# Patient Record
Sex: FEMALE | Race: WHITE | NOT HISPANIC OR LATINO | Employment: FULL TIME | URBAN - METROPOLITAN AREA
[De-identification: names, ages, dates, MRNs, and addresses within clinical notes are randomized per-mention and may not be internally consistent; named-entity substitution may affect disease eponyms.]

---

## 2023-11-15 LAB
ABO AND RH: NORMAL
C TRACH DNA SPEC QL NAA+PROBE: NEGATIVE
HBV SURFACE AG SERPL QL IA: NEGATIVE
HCV AB SERPL QL IA: NEGATIVE
HIV 1+2 AB+HIV1 P24 AG SERPL QL IA: NEGATIVE
N GONORRHOEA DNA SPEC QL NAA+PROBE: NEGATIVE
RUBELLA IMMUNE STATUS: NORMAL
T PALLIDUM AB SER QL: NONREACTIVE

## 2024-01-16 DIAGNOSIS — O30.032 MONOCHORIONIC DIAMNIOTIC TWIN GESTATION IN SECOND TRIMESTER: Primary | ICD-10-CM

## 2024-01-16 DIAGNOSIS — D25.1 INTRAMURAL UTERINE FIBROID: ICD-10-CM

## 2024-01-18 ENCOUNTER — PATIENT MESSAGE (OUTPATIENT)
Dept: PEDIATRIC CARDIOLOGY | Facility: CLINIC | Age: 34
End: 2024-01-18
Payer: COMMERCIAL

## 2024-01-18 ENCOUNTER — PROCEDURE VISIT (OUTPATIENT)
Dept: MATERNAL FETAL MEDICINE | Facility: CLINIC | Age: 34
End: 2024-01-18
Payer: COMMERCIAL

## 2024-01-18 ENCOUNTER — TELEPHONE (OUTPATIENT)
Dept: PEDIATRIC CARDIOLOGY | Facility: CLINIC | Age: 34
End: 2024-01-18
Payer: COMMERCIAL

## 2024-01-18 ENCOUNTER — OFFICE VISIT (OUTPATIENT)
Dept: MATERNAL FETAL MEDICINE | Facility: CLINIC | Age: 34
End: 2024-01-18
Payer: COMMERCIAL

## 2024-01-18 VITALS
HEIGHT: 65 IN | HEART RATE: 98 BPM | SYSTOLIC BLOOD PRESSURE: 121 MMHG | BODY MASS INDEX: 22.51 KG/M2 | DIASTOLIC BLOOD PRESSURE: 78 MMHG | WEIGHT: 135.13 LBS

## 2024-01-18 DIAGNOSIS — D25.1 INTRAMURAL UTERINE FIBROID: ICD-10-CM

## 2024-01-18 DIAGNOSIS — O30.032 MONOCHORIONIC DIAMNIOTIC TWIN GESTATION IN SECOND TRIMESTER: Primary | ICD-10-CM

## 2024-01-18 DIAGNOSIS — O30.032 MONOCHORIONIC DIAMNIOTIC TWIN GESTATION IN SECOND TRIMESTER: ICD-10-CM

## 2024-01-18 PROCEDURE — 3008F BODY MASS INDEX DOCD: CPT | Mod: CPTII,S$GLB,, | Performed by: OBSTETRICS & GYNECOLOGY

## 2024-01-18 PROCEDURE — 76810 OB US >/= 14 WKS ADDL FETUS: CPT | Mod: S$GLB,,, | Performed by: OBSTETRICS & GYNECOLOGY

## 2024-01-18 PROCEDURE — 99204 OFFICE O/P NEW MOD 45 MIN: CPT | Mod: S$GLB,,, | Performed by: OBSTETRICS & GYNECOLOGY

## 2024-01-18 PROCEDURE — 1160F RVW MEDS BY RX/DR IN RCRD: CPT | Mod: CPTII,S$GLB,, | Performed by: OBSTETRICS & GYNECOLOGY

## 2024-01-18 PROCEDURE — 1159F MED LIST DOCD IN RCRD: CPT | Mod: CPTII,S$GLB,, | Performed by: OBSTETRICS & GYNECOLOGY

## 2024-01-18 PROCEDURE — 3074F SYST BP LT 130 MM HG: CPT | Mod: CPTII,S$GLB,, | Performed by: OBSTETRICS & GYNECOLOGY

## 2024-01-18 PROCEDURE — 3078F DIAST BP <80 MM HG: CPT | Mod: CPTII,S$GLB,, | Performed by: OBSTETRICS & GYNECOLOGY

## 2024-01-18 PROCEDURE — 76805 OB US >/= 14 WKS SNGL FETUS: CPT | Mod: S$GLB,,, | Performed by: OBSTETRICS & GYNECOLOGY

## 2024-01-18 RX ORDER — ASPIRIN 81 MG/1
81 TABLET ORAL DAILY
Qty: 30 TABLET | Refills: 6 | Status: CANCELLED | COMMUNITY
Start: 2024-01-18

## 2024-01-18 RX ORDER — FOLIC ACID 1 MG/1
1 TABLET ORAL DAILY
COMMUNITY

## 2024-01-18 NOTE — PROGRESS NOTES
"MATERNAL-FETAL MEDICINE   CONSULT NOTE    Provider requesting consultation: Dr Barber    SUBJECTIVE:     Ms. Nat Tong is a 33 y.o.  female with IUP at 17w4d who is seen in consultation by MFM for evaluation and management of:  Problem   1. Monochorionic diamniotic twin gestation in second trimester     Nat presents for consultation due to monochorionic (mono/Di) twins. She is not taking a low dose aspirin daily. She politely declined biochemical screening but states she would like want testing if there was a noted issue later in gestation.She is an RN at [employer].       Medication List with Changes/Refills   Current Medications    FOLIC ACID (FOLVITE) 1 MG TABLET    Take 1 mg by mouth once daily.    PRENATAL VIT NO.124/IRON/FOLIC (PRENATAL VITAMIN ORAL)    Take by mouth.       Review of patient's allergies indicates:  No Known Allergies    PMH:  Past Medical History:   Diagnosis Date    Gastritis        PObHx:  OB History    Para Term  AB Living   1             SAB IAB Ectopic Multiple Live Births                  # Outcome Date GA Lbr Jesus Alberto/2nd Weight Sex Delivery Anes PTL Lv   1 Current                PSH:  Past Surgical History:   Procedure Laterality Date    WISDOM TOOTH EXTRACTION         Family history:family history includes Hyperlipidemia in her mother; Hypertension in her father.    Social history: reports that she has never smoked. She has never used smokeless tobacco. She reports that she does not currently use alcohol. She reports that she does not use drugs.    Genetic history: The patient denies any inherited genetic diseases or birth defects in herself or her partner's personal history or family.    Objective:   /78   Pulse 98   Ht 5' 5" (1.651 m)   Wt 61.3 kg (135 lb 2.3 oz)   BMI 22.49 kg/m²     Ultrasound performed. See viewpoint for full ultrasound report.    Monochorionic diamniotic twin intrauterine pregnancy is identified with two living fetuses " (males).   Baby A's cord inserts marginally near fundus and the estimated fetal weight is 242g. Early anatomic survey is unremarkable. MVP is 4cm.   Baby B's cord inserts more centrally and estimated fetal weight is 207g. Early anatomic survey is unremarkable. MVP is 3.1cm.   The fetal sex is congruent and the dividing membrane is thin, in agreement with stated monochorionic diamniotic twins.   Both fetal bladders are appropriately visible.  Fetal growth is concordant with 14% difference.   The placenta is anterior.    ASSESSMENT/PLAN:     33 y.o.  female with IUP at 17w4d     1. Monochorionic diamniotic twin gestation in second trimester  We discussed monochorionic twinning and reviewed the types of placentation seen and their frequency. I counseled her on the increased incidence of preeclampsia/gestational hypertension, gestational diabetes, fetal growth restriction, anemia, congenital anomalies, need for antepartum hospitalization,  labor/PPROM, stillbirth, and risk of postpartum hemorrhage that can occur in twin pregnancies. It was explained that all monochorionic twins, have a single placenta and that this puts them at risk for twin transfusion syndrome (TTTS). We discussed twin-transfusion syndrome which is seen in ~ 10-15% of monochorionic twins due to unbalanced vascular communications within the placenta and can result in discordant fetal growth and fluid volume. We discussed that in its severe form, it can potentially result in  delivery or fetal morbidity or mortality due to poor placental function of the smaller twin and volume overload and cardiomyopathy of the larger twin. Treatment may consist of observation or laser photocoagulation of communicating vessels depending upon the circumstances and timing of the presentation. I reviewed with the patient the need for fetal ultrasound assessment for TTTS surveillance every two weeks starting at 16 weeks.     Recommendations:   Fetal  ultrasound assessment every two weeks, starting at 16 weeks until delivery for TTTS surveillance (scheduled with [facility])  If high suspicion for evolving TTTS, increase ultrasound surveillance to once or twice weekly  Transvaginal cervical length screening at time of targeted anatomy ultrasound w/ MFM  Detailed anatomy surveys at 18-20 weeks (scheduled with MFM)  Fetal growth ultrasounds every 3-4 weeks starting at 23-24 weeks (scheduled with MFM)  Fetal echocardiogram at 22-24 weeks (MFM will arrange)  Low-dose aspirin daily (81 mg) beginning at 12-13 weeks to reduce the risk of preeclampsia - will  OTC and start this week  Folic acid 1 mg daily and ferrous sulfate 325 mg daily  Increase daily dietary intake by approximately 300 kcal above that for a mares pregnancy, or 600 kcal over that of a nonpregnant woman and monitor weight gain   testing with weekly NST and AFV assessment beginning at 32 weeks (can alternate with MFM every other week)  Given the increased risks of a twin pregnancy, prenatal visits every 2-3 weeks from 24 - 32/34 weeks and weekly prenatal visits thereafter    Delivery timing:   Normal growth, no complications: 37 0/7 - 37 6/7 weeks gestation   Normal growth, maternal comorbidities: 36 0/7 - 36 6/7 weeks gestation   FGR of one or both: 34 0/7 - 35 6/7 weeks gestation  History of TTTS, FGR with abnormal UA Doppler, oligohydramnios, or comorbid conditions: 32 0/7 - 34 6/7 weeks gestation    Comorbid conditions include: BMI >= 40, diabetes, hypertension, and complex maternal medical conditions associated with placental dysfunction (Lupus, renal disease, or other vascular disease).        FOLLOW UP:   F/u Q2w for TTTS surveillance  Will perform targeted ultrasound at next visit    This consultation was completed with the assistance of Yolanda Shay NP.        Christianne Saleem MD  Maternal Fetal Medicine

## 2024-01-18 NOTE — ASSESSMENT & PLAN NOTE
We discussed monochorionic twinning and reviewed the types of placentation seen and their frequency. I counseled her on the increased incidence of preeclampsia/gestational hypertension, gestational diabetes, fetal growth restriction, anemia, congenital anomalies, need for antepartum hospitalization,  labor/PPROM, stillbirth, and risk of postpartum hemorrhage that can occur in twin pregnancies. It was explained that all monochorionic twins, have a single placenta and that this puts them at risk for twin transfusion syndrome (TTTS). We discussed twin-transfusion syndrome which is seen in ~ 10-15% of monochorionic twins due to unbalanced vascular communications within the placenta and can result in discordant fetal growth and fluid volume. We discussed that in its severe form, it can potentially result in  delivery or fetal morbidity or mortality due to poor placental function of the smaller twin and volume overload and cardiomyopathy of the larger twin. Treatment may consist of observation or laser photocoagulation of communicating vessels depending upon the circumstances and timing of the presentation. I reviewed with the patient the need for fetal ultrasound assessment for TTTS surveillance every two weeks starting at 16 weeks.     Recommendations:   Fetal ultrasound assessment every two weeks, starting at 16 weeks until delivery for TTTS surveillance (scheduled with MFM)  If high suspicion for evolving TTTS, increase ultrasound surveillance to once or twice weekly  Transvaginal cervical length screening at time of targeted anatomy ultrasound w/ MFM  Detailed anatomy surveys at 18-20 weeks (scheduled with MFM)  Fetal growth ultrasounds every 3-4 weeks starting at 23-24 weeks (scheduled with MFM)  Fetal echocardiogram at 22-24 weeks (MFM will arrange)  Low-dose aspirin daily (81 mg) beginning at 12-13 weeks to reduce the risk of preeclampsia - will  OTC and start this week  Folic acid 1 mg daily  and ferrous sulfate 325 mg daily  Increase daily dietary intake by approximately 300 kcal above that for a mares pregnancy, or 600 kcal over that of a nonpregnant woman and monitor weight gain   testing with weekly NST and AFV assessment beginning at 32 weeks (can alternate with MFM every other week)  Given the increased risks of a twin pregnancy, prenatal visits every 2-3 weeks from 24 - 32/34 weeks and weekly prenatal visits thereafter    Delivery timing:   Normal growth, no complications: 37 0/7 - 37 6/7 weeks gestation   Normal growth, maternal comorbidities: 36 0/7 - 36 6/7 weeks gestation   FGR of one or both: 34 0/7 - 35 6/7 weeks gestation  History of TTTS, FGR with abnormal UA Doppler, oligohydramnios, or comorbid conditions: 32 0/7 - 34 6/7 weeks gestation    Comorbid conditions include: BMI >= 40, diabetes, hypertension, and complex maternal medical conditions associated with placental dysfunction (Lupus, renal disease, or other vascular disease).

## 2024-01-18 NOTE — TELEPHONE ENCOUNTER
Call placed to patient in an attempt to schedule her fetal echos. Call answered by voicemail recording. VM left requesting a return call to [phone] to schedule fetal echos.

## 2024-01-29 DIAGNOSIS — D25.1 INTRAMURAL UTERINE FIBROID: ICD-10-CM

## 2024-01-29 DIAGNOSIS — O30.032 MONOCHORIONIC DIAMNIOTIC TWIN GESTATION IN SECOND TRIMESTER: Primary | ICD-10-CM

## 2024-01-31 ENCOUNTER — OFFICE VISIT (OUTPATIENT)
Dept: MATERNAL FETAL MEDICINE | Facility: CLINIC | Age: 34
End: 2024-01-31
Payer: COMMERCIAL

## 2024-01-31 ENCOUNTER — PROCEDURE VISIT (OUTPATIENT)
Dept: MATERNAL FETAL MEDICINE | Facility: CLINIC | Age: 34
End: 2024-01-31
Payer: COMMERCIAL

## 2024-01-31 VITALS
HEIGHT: 65 IN | WEIGHT: 140 LBS | HEART RATE: 113 BPM | DIASTOLIC BLOOD PRESSURE: 69 MMHG | BODY MASS INDEX: 23.32 KG/M2 | SYSTOLIC BLOOD PRESSURE: 127 MMHG

## 2024-01-31 DIAGNOSIS — O30.032 MONOCHORIONIC DIAMNIOTIC TWIN GESTATION IN SECOND TRIMESTER: ICD-10-CM

## 2024-01-31 DIAGNOSIS — O30.032 MONOCHORIONIC DIAMNIOTIC TWIN GESTATION IN SECOND TRIMESTER: Primary | ICD-10-CM

## 2024-01-31 DIAGNOSIS — D25.1 INTRAMURAL UTERINE FIBROID: ICD-10-CM

## 2024-01-31 PROCEDURE — 3008F BODY MASS INDEX DOCD: CPT | Mod: CPTII,S$GLB,, | Performed by: OBSTETRICS & GYNECOLOGY

## 2024-01-31 PROCEDURE — 76811 OB US DETAILED SNGL FETUS: CPT | Mod: S$GLB,,, | Performed by: OBSTETRICS & GYNECOLOGY

## 2024-01-31 PROCEDURE — 3078F DIAST BP <80 MM HG: CPT | Mod: CPTII,S$GLB,, | Performed by: OBSTETRICS & GYNECOLOGY

## 2024-01-31 PROCEDURE — 1160F RVW MEDS BY RX/DR IN RCRD: CPT | Mod: CPTII,S$GLB,, | Performed by: OBSTETRICS & GYNECOLOGY

## 2024-01-31 PROCEDURE — 76817 TRANSVAGINAL US OBSTETRIC: CPT | Mod: S$GLB,,, | Performed by: OBSTETRICS & GYNECOLOGY

## 2024-01-31 PROCEDURE — 3074F SYST BP LT 130 MM HG: CPT | Mod: CPTII,S$GLB,, | Performed by: OBSTETRICS & GYNECOLOGY

## 2024-01-31 PROCEDURE — 1159F MED LIST DOCD IN RCRD: CPT | Mod: CPTII,S$GLB,, | Performed by: OBSTETRICS & GYNECOLOGY

## 2024-01-31 PROCEDURE — 99213 OFFICE O/P EST LOW 20 MIN: CPT | Mod: S$GLB,,, | Performed by: OBSTETRICS & GYNECOLOGY

## 2024-01-31 PROCEDURE — 76812 OB US DETAILED ADDL FETUS: CPT | Mod: S$GLB,,, | Performed by: OBSTETRICS & GYNECOLOGY

## 2024-01-31 RX ORDER — ASPIRIN 81 MG/1
81 TABLET ORAL DAILY
COMMUNITY

## 2024-01-31 NOTE — PROGRESS NOTES
"Maternal Fetal Medicine follow up consult    SUBJECTIVE:     Nat Tong is a 33 y.o.  female with IUP at 19w3d who is seen in follow up consultation by MFM.  Pregnancy complications include:   Problem   1. Monochorionic diamniotic twin gestation in second trimester     Nat presents for routine follow up appointment.  Denies LOF, VB, contractions. Positive fetal movement  She is taking baby aspirin.    Previous notes reviewed.   No changes to medical, surgical, family, social, or obstetric history.    Interval history since last MFM visit: see above    Medications:  Current Outpatient Medications   Medication Instructions    aspirin (ECOTRIN) 81 mg, Oral, Daily    folic acid (FOLVITE) 1 mg, Oral, Daily    prenatal vit no.124/iron/folic (PRENATAL VITAMIN ORAL) Oral       Care team members:  Dr Barber - Primary OB     OBJECTIVE:   /69   Pulse (!) 113   Ht 5' 5" (1.651 m)   Wt 63.5 kg (139 lb 15.9 oz)   BMI 23.30 kg/m²     Ultrasound performed. See viewpoint for full ultrasound report.    Monochorionic diamniotic twin intrauterine pregnancy is identified with two living fetuses (breech-breech).   Baby A measures 270g and has unremarkable fetal anatomy. Suboptimal views remain for LVOT, AA, DA, 3VC, and feet position. MVP is 3.1cm.  Baby B measures 306g and has unremarkable fetal anatomy. Suboptimal views remain for RVOT, septum and feet position. MVP is 3.6cm.   The fetal sex is congruent and the dividing membrane is thin, in agreement with stated monochorionic diamniotic twins.  Both fetal bladders and stomachs are visible. Amniotic fluid volume is normal for both.  Fetal growth is concordant with 12% difference.   The placenta is anterior with a placental sinus noted near the lower uterine segment. The cord insertion for baby A is marginal near the fundus. Baby B's cord insertion is central.  A small fibroid is present anteriorly measuring 2.7 x 3.6 x 3.8cm.   Transvaginal cervical length " measures 3.1cm.    ASSESSMENT/PLAN:     33 y.o.  female with IUP at 19w3d    1. Monochorionic diamniotic twin gestation in second trimester  Previously counseled on monochorionic twinning and reviewed the types of placentation seen and their frequency. I counseled her on the increased incidence of preeclampsia/gestational hypertension, gestational diabetes, fetal growth restriction, anemia, congenital anomalies, need for antepartum hospitalization,  labor/PPROM, stillbirth, and risk of postpartum hemorrhage that can occur in twin pregnancies. It was explained that all monochorionic twins, have a single placenta and that this puts them at risk for twin transfusion syndrome (TTTS).     24- Concordant growth with subjectively normal AFV. Stomach and bladder visualized for each twin. No evidence of TTTS    Recommendations:   Fetal ultrasound assessment every two weeks, starting at 16 weeks until delivery for TTTS surveillance (scheduled with MFM)  If high suspicion for evolving TTTS, increase ultrasound surveillance to once or twice weekly  Transvaginal cervical length screening at time of targeted anatomy ultrasound w/ MFM (completed & normal)  Detailed anatomy surveys at 18-20 weeks (started, some views suboptimal)  Fetal growth ultrasounds every 3-4 weeks starting at 23-24 weeks (scheduled with MFM)  Fetal echocardiogram at 22-24 weeks (scheduled 3/12/24)  Low-dose aspirin daily (81 mg) beginning at 12-13 weeks to reduce the risk of preeclampsia - she is taking  Folic acid 1 mg daily and ferrous sulfate 325 mg daily  Increase daily dietary intake by approximately 300 kcal above that for a mares pregnancy, or 600 kcal over that of a nonpregnant woman and monitor weight gain   testing with weekly NST and AFV assessment beginning at 32 weeks (can alternate with MFM every other week)  Given the increased risks of a twin pregnancy, prenatal visits every 2-3 weeks from 24 - 32/34 weeks and  weekly prenatal visits thereafter    Delivery timing:   Normal growth, no complications: 37 0/7 - 37 6/7 weeks gestation   Normal growth, maternal comorbidities: 36 0/7 - 36 6/7 weeks gestation   FGR of one or both: 34 0/7 - 35 6/7 weeks gestation  History of TTTS, FGR with abnormal UA Doppler, oligohydramnios, or comorbid conditions: 32 0/7 - 34 6/7 weeks gestation    Comorbid conditions include: BMI >= 40, diabetes, hypertension, and complex maternal medical conditions associated with placental dysfunction (Lupus, renal disease, or other vascular disease).    F/u q2w for TTTS surveillance  F/u in 4 weeks for MFM visit/growth ultrasound    Christianne Saleem MD  Maternal Fetal Medicine

## 2024-01-31 NOTE — ASSESSMENT & PLAN NOTE
Previously counseled on monochorionic twinning and reviewed the types of placentation seen and their frequency. I counseled her on the increased incidence of preeclampsia/gestational hypertension, gestational diabetes, fetal growth restriction, anemia, congenital anomalies, need for antepartum hospitalization,  labor/PPROM, stillbirth, and risk of postpartum hemorrhage that can occur in twin pregnancies. It was explained that all monochorionic twins, have a single placenta and that this puts them at risk for twin transfusion syndrome (TTTS).     24- Concordant growth with subjectively normal AFV. Stomach and bladder visualized for each twin. No evidence of TTTS    Recommendations:   Fetal ultrasound assessment every two weeks, starting at 16 weeks until delivery for TTTS surveillance (scheduled with MFM)  If high suspicion for evolving TTTS, increase ultrasound surveillance to once or twice weekly  Transvaginal cervical length screening at time of targeted anatomy ultrasound w/ MFM (completed & normal)  Detailed anatomy surveys at 18-20 weeks (started, some views suboptimal)  Fetal growth ultrasounds every 3-4 weeks starting at 23-24 weeks (scheduled with MFM)  Fetal echocardiogram at 22-24 weeks (scheduled 3/12/24)  Low-dose aspirin daily (81 mg) beginning at 12-13 weeks to reduce the risk of preeclampsia - she is taking  Folic acid 1 mg daily and ferrous sulfate 325 mg daily  Increase daily dietary intake by approximately 300 kcal above that for a mares pregnancy, or 600 kcal over that of a nonpregnant woman and monitor weight gain   testing with weekly NST and AFV assessment beginning at 32 weeks (can alternate with MFM every other week)  Given the increased risks of a twin pregnancy, prenatal visits every 2-3 weeks from 24 - 32/34 weeks and weekly prenatal visits thereafter    Delivery timing:   Normal growth, no complications: 37 0/7 - 37 6/7 weeks gestation   Normal growth, maternal  comorbidities: 36 0/7 - 36 6/7 weeks gestation   FGR of one or both: 34 0/7 - 35 6/7 weeks gestation  History of TTTS, FGR with abnormal UA Doppler, oligohydramnios, or comorbid conditions: 32 0/7 - 34 6/7 weeks gestation    Comorbid conditions include: BMI >= 40, diabetes, hypertension, and complex maternal medical conditions associated with placental dysfunction (Lupus, renal disease, or other vascular disease).

## 2024-02-01 ENCOUNTER — PATIENT MESSAGE (OUTPATIENT)
Dept: MATERNAL FETAL MEDICINE | Facility: CLINIC | Age: 34
End: 2024-02-01
Payer: COMMERCIAL

## 2024-02-07 DIAGNOSIS — O30.032 MONOCHORIONIC DIAMNIOTIC TWIN GESTATION IN SECOND TRIMESTER: Primary | ICD-10-CM

## 2024-02-15 ENCOUNTER — PROCEDURE VISIT (OUTPATIENT)
Dept: MATERNAL FETAL MEDICINE | Facility: CLINIC | Age: 34
End: 2024-02-15
Payer: COMMERCIAL

## 2024-02-15 VITALS — SYSTOLIC BLOOD PRESSURE: 112 MMHG | HEART RATE: 139 BPM | DIASTOLIC BLOOD PRESSURE: 67 MMHG

## 2024-02-15 DIAGNOSIS — O30.032 MONOCHORIONIC DIAMNIOTIC TWIN GESTATION IN SECOND TRIMESTER: ICD-10-CM

## 2024-02-15 DIAGNOSIS — F41.9 ANXIETY DURING PREGNANCY: Primary | ICD-10-CM

## 2024-02-15 DIAGNOSIS — O99.340 ANXIETY DURING PREGNANCY: Primary | ICD-10-CM

## 2024-02-15 PROCEDURE — 76815 OB US LIMITED FETUS(S): CPT | Mod: S$GLB,,, | Performed by: OBSTETRICS & GYNECOLOGY

## 2024-02-15 RX ORDER — BUSPIRONE HYDROCHLORIDE 5 MG/1
5 TABLET ORAL 3 TIMES DAILY PRN
Qty: 60 TABLET | Refills: 3 | Status: SHIPPED | OUTPATIENT
Start: 2024-02-15

## 2024-02-29 ENCOUNTER — PROCEDURE VISIT (OUTPATIENT)
Dept: MATERNAL FETAL MEDICINE | Facility: CLINIC | Age: 34
End: 2024-02-29
Payer: COMMERCIAL

## 2024-02-29 ENCOUNTER — OFFICE VISIT (OUTPATIENT)
Dept: MATERNAL FETAL MEDICINE | Facility: CLINIC | Age: 34
End: 2024-02-29
Payer: COMMERCIAL

## 2024-02-29 VITALS
HEART RATE: 121 BPM | HEIGHT: 65 IN | DIASTOLIC BLOOD PRESSURE: 77 MMHG | WEIGHT: 145.94 LBS | BODY MASS INDEX: 24.32 KG/M2 | SYSTOLIC BLOOD PRESSURE: 123 MMHG

## 2024-02-29 DIAGNOSIS — O30.032 MONOCHORIONIC DIAMNIOTIC TWIN GESTATION IN SECOND TRIMESTER: Primary | ICD-10-CM

## 2024-02-29 DIAGNOSIS — O30.032 MONOCHORIONIC DIAMNIOTIC TWIN PREGNANCY IN SECOND TRIMESTER: ICD-10-CM

## 2024-02-29 DIAGNOSIS — O30.032 MONOCHORIONIC DIAMNIOTIC TWIN GESTATION IN SECOND TRIMESTER: ICD-10-CM

## 2024-02-29 DIAGNOSIS — O30.032 MONOCHORIONIC DIAMNIOTIC TWIN PREGNANCY IN SECOND TRIMESTER: Primary | ICD-10-CM

## 2024-02-29 PROCEDURE — 3074F SYST BP LT 130 MM HG: CPT | Mod: CPTII,S$GLB,, | Performed by: OBSTETRICS & GYNECOLOGY

## 2024-02-29 PROCEDURE — 76812 OB US DETAILED ADDL FETUS: CPT | Mod: S$GLB,,, | Performed by: OBSTETRICS & GYNECOLOGY

## 2024-02-29 PROCEDURE — 3008F BODY MASS INDEX DOCD: CPT | Mod: CPTII,S$GLB,, | Performed by: OBSTETRICS & GYNECOLOGY

## 2024-02-29 PROCEDURE — 1159F MED LIST DOCD IN RCRD: CPT | Mod: CPTII,S$GLB,, | Performed by: OBSTETRICS & GYNECOLOGY

## 2024-02-29 PROCEDURE — 76811 OB US DETAILED SNGL FETUS: CPT | Mod: S$GLB,,, | Performed by: OBSTETRICS & GYNECOLOGY

## 2024-02-29 PROCEDURE — 3078F DIAST BP <80 MM HG: CPT | Mod: CPTII,S$GLB,, | Performed by: OBSTETRICS & GYNECOLOGY

## 2024-02-29 PROCEDURE — 99213 OFFICE O/P EST LOW 20 MIN: CPT | Mod: S$GLB,,, | Performed by: OBSTETRICS & GYNECOLOGY

## 2024-02-29 PROCEDURE — 1160F RVW MEDS BY RX/DR IN RCRD: CPT | Mod: CPTII,S$GLB,, | Performed by: OBSTETRICS & GYNECOLOGY

## 2024-02-29 NOTE — ASSESSMENT & PLAN NOTE
Previously counseled on monochorionic twinning and reviewed the types of placentation seen and their frequency. I counseled her on the increased incidence of preeclampsia/gestational hypertension, gestational diabetes, fetal growth restriction, anemia, congenital anomalies, need for antepartum hospitalization,  labor/PPROM, stillbirth, and risk of postpartum hemorrhage that can occur in twin pregnancies. It was explained that all monochorionic twins, have a single placenta and that this puts them at risk for twin transfusion syndrome (TTTS).     24- Concordant growth with subjectively normal AFV. Stomach and bladder visualized for each twin. No evidence of TTTS  24- Baby A EFW 653g, Baby B EFW 600g; Concordant growth with subjectively normal AFV. Stomach and bladder visualized for each twin. No evidence of TTTS    Recommendations:   Fetal ultrasound assessment every two weeks, starting at 16 weeks until delivery for TTTS surveillance (scheduled with MFM)  If high suspicion for evolving TTTS, increase ultrasound surveillance to once or twice weekly  Transvaginal cervical length screening at time of targeted anatomy ultrasound w/ MFM (completed & normal)  Detailed anatomy surveys at 18-20 weeks (started, some views suboptimal)  Fetal growth ultrasounds every 3-4 weeks starting at 23-24 weeks (scheduled with MFM)  Fetal echocardiogram at 22-24 weeks (scheduled 3/12/24)  Low-dose aspirin daily (81 mg) beginning at 12-13 weeks to reduce the risk of preeclampsia - she is taking  Folic acid 1 mg daily and ferrous sulfate 325 mg daily  Increase daily dietary intake by approximately 300 kcal above that for a mares pregnancy, or 600 kcal over that of a nonpregnant woman and monitor weight gain   testing with weekly NST and AFV assessment beginning at 32 weeks (can alternate with MFM every other week)  Given the increased risks of a twin pregnancy, prenatal visits every 2-3 weeks from 24 -   weeks and weekly prenatal visits thereafter    Delivery timing:   Normal growth, no complications: 37 0/7 - 37 6/7 weeks gestation   Normal growth, maternal comorbidities: 36 0/7 - 36 6/7 weeks gestation   FGR of one or both: 34 0/7 - 35 6/7 weeks gestation  History of TTTS, FGR with abnormal UA Doppler, oligohydramnios, or comorbid conditions: 32 0/7 - 34 6/7 weeks gestation    Comorbid conditions include: BMI >= 40, diabetes, hypertension, and complex maternal medical conditions associated with placental dysfunction (Lupus, renal disease, or other vascular disease).

## 2024-03-01 NOTE — PROGRESS NOTES
"Maternal Fetal Medicine follow up consult    SUBJECTIVE:     Nat Tong is a 33 y.o.  female with IUP at 23w4d who is seen in follow up consultation by MFM.  Pregnancy complications include:   Problem   1. Monochorionic diamniotic twin gestation in second trimester       Nat presents for routine follow up appointment.  Denies LOF, VB, contractions. Positive fetal movement  She is taking baby aspirin.  She has not started the buspar rx given. 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    Previous notes reviewed.   No changes to medical, surgical, family, social, or obstetric history.    Interval history since last MFM visit: see above    Medications:  Current Outpatient Medications   Medication Instructions    aspirin (ECOTRIN) 81 mg, Oral, Daily    busPIRone (BUSPAR) 5 mg, Oral, 3 times daily PRN    folic acid (FOLVITE) 1 mg, Oral, Daily    prenatal vit no.124/iron/folic (PRENATAL VITAMIN ORAL) Oral       Care team members:  Dr Barber - Primary OB     OBJECTIVE:   /77   Pulse (!) 121   Ht 5' 5" (1.651 m)   Wt 66.2 kg (145 lb 15.1 oz)   BMI 24.29 kg/m²     Ultrasound performed. See viewpoint for full ultrasound report.    A viable monochorionic- diamniotic pregnancy is visualized in the vertex-breech position.   At her previous anatomy scan, fetuses were mislabeled and error has been noted. On anatomy visit, A is B and B is A.   Baby A (left, fundal/ marginal cord insertion)- No suspected abnormalities and anatomy remains suboptimal for feet only. The fetus is male.  Baby B (right, central cord insertion) has no suspected abnormalities and anatomic survey remains suboptimal for AA only. The fetus is male.   Estimated fetal weights for the fetuses are as follows: Baby A EFW 653g, Baby B EFW 600g. Both are appropriate for gestational age with 8% difference (concordant growth).   Amniotic fluid volume is normal for " both with MVPs of  4.6cm (A) and 4cm (B).  Both bladders are visible, no signs of TTTS.   Placental location is anterior without evidence of previa.     ASSESSMENT/PLAN:     33 y.o.  female with IUP at 23w4d    1. Monochorionic diamniotic twin gestation in second trimester  Previously counseled on monochorionic twinning and reviewed the types of placentation seen and their frequency. I counseled her on the increased incidence of preeclampsia/gestational hypertension, gestational diabetes, fetal growth restriction, anemia, congenital anomalies, need for antepartum hospitalization,  labor/PPROM, stillbirth, and risk of postpartum hemorrhage that can occur in twin pregnancies. It was explained that all monochorionic twins, have a single placenta and that this puts them at risk for twin transfusion syndrome (TTTS).     24- Concordant growth with subjectively normal AFV. Stomach and bladder visualized for each twin. No evidence of TTTS  24- Baby A EFW 653g, Baby B EFW 600g; Concordant growth with subjectively normal AFV. Stomach and bladder visualized for each twin. No evidence of TTTS    Recommendations:   Fetal ultrasound assessment every two weeks, starting at 16 weeks until delivery for TTTS surveillance (scheduled with MFM)  If high suspicion for evolving TTTS, increase ultrasound surveillance to once or twice weekly  Transvaginal cervical length screening at time of targeted anatomy ultrasound w/ MFM (completed & normal)  Detailed anatomy surveys at 18-20 weeks (started, some views suboptimal)  Fetal growth ultrasounds every 3-4 weeks starting at 23-24 weeks (scheduled with MFM)  Fetal echocardiogram at 22-24 weeks (scheduled 3/12/24)  Low-dose aspirin daily (81 mg) beginning at 12-13 weeks to reduce the risk of preeclampsia - she is taking  Folic acid 1 mg daily and ferrous sulfate 325 mg daily  Increase daily dietary intake by approximately 300 kcal above that for a mares pregnancy, or  600 kcal over that of a nonpregnant woman and monitor weight gain   testing with weekly NST and AFV assessment beginning at 32 weeks (can alternate with MFM every other week)  Given the increased risks of a twin pregnancy, prenatal visits every 2-3 weeks from 24 - 32/34 weeks and weekly prenatal visits thereafter    Delivery timing:   Normal growth, no complications: 37 0/7 - 37 6/7 weeks gestation   Normal growth, maternal comorbidities: 36 0/7 - 36 6/7 weeks gestation   FGR of one or both: 34 0/7 - 35 6/7 weeks gestation  History of TTTS, FGR with abnormal UA Doppler, oligohydramnios, or comorbid conditions: 32 0/7 - 34 6/7 weeks gestation    Comorbid conditions include: BMI >= 40, diabetes, hypertension, and complex maternal medical conditions associated with placental dysfunction (Lupus, renal disease, or other vascular disease).    F/u q2w for TTTS surveillance  F/u in 4 weeks for MFM visit/growth ultrasound    Christianne Saleem MD  Maternal Fetal Medicine

## 2024-03-12 ENCOUNTER — OFFICE VISIT (OUTPATIENT)
Dept: PEDIATRIC CARDIOLOGY | Facility: CLINIC | Age: 34
End: 2024-03-12
Payer: COMMERCIAL

## 2024-03-12 ENCOUNTER — PROCEDURE VISIT (OUTPATIENT)
Dept: MATERNAL FETAL MEDICINE | Facility: CLINIC | Age: 34
End: 2024-03-12
Payer: COMMERCIAL

## 2024-03-12 VITALS
HEIGHT: 65 IN | WEIGHT: 147.94 LBS | DIASTOLIC BLOOD PRESSURE: 74 MMHG | HEART RATE: 117 BPM | HEART RATE: 117 BPM | SYSTOLIC BLOOD PRESSURE: 121 MMHG | DIASTOLIC BLOOD PRESSURE: 74 MMHG | SYSTOLIC BLOOD PRESSURE: 121 MMHG | BODY MASS INDEX: 24.65 KG/M2

## 2024-03-12 DIAGNOSIS — O30.032 MONOCHORIONIC DIAMNIOTIC TWIN GESTATION IN SECOND TRIMESTER: Primary | ICD-10-CM

## 2024-03-12 DIAGNOSIS — O30.032 MONOCHORIONIC DIAMNIOTIC TWIN GESTATION IN SECOND TRIMESTER: ICD-10-CM

## 2024-03-12 PROCEDURE — 99204 OFFICE O/P NEW MOD 45 MIN: CPT | Mod: 25,S$GLB,, | Performed by: PEDIATRICS

## 2024-03-12 PROCEDURE — 76815 OB US LIMITED FETUS(S): CPT | Mod: S$GLB,,, | Performed by: OBSTETRICS & GYNECOLOGY

## 2024-03-12 PROCEDURE — 3074F SYST BP LT 130 MM HG: CPT | Mod: CPTII,S$GLB,, | Performed by: PEDIATRICS

## 2024-03-12 PROCEDURE — 3078F DIAST BP <80 MM HG: CPT | Mod: CPTII,S$GLB,, | Performed by: PEDIATRICS

## 2024-03-12 PROCEDURE — 3008F BODY MASS INDEX DOCD: CPT | Mod: CPTII,S$GLB,, | Performed by: PEDIATRICS

## 2024-03-12 PROCEDURE — 1159F MED LIST DOCD IN RCRD: CPT | Mod: CPTII,S$GLB,, | Performed by: PEDIATRICS

## 2024-03-12 NOTE — PROGRESS NOTES
[facility] - Pediatric Cardiology Fetal Cardiology Clinic    Today, I had the pleasure of evaluating Nat Tong who is now 33 y.o. and carrying her first pregnancy at 25 2/7 weeks gestation with an ESEQUIEL of 24. She was referred for evaluation of the fetal heart due to mono-di twin gestation.       She is carrying a male fetuses, to be named Zachary and Celestine.  Pregnancy thus far has been otherwise uncomplicated with no signs of twin-twin syndrome.     Obstetric History:    .  Her OB history is otherwise unremarkable.     Past Medical History:   Diagnosis Date    Gastritis          Current Outpatient Medications:     aspirin (ECOTRIN) 81 MG EC tablet, Take 81 mg by mouth once daily., Disp: , Rfl:     busPIRone (BUSPAR) 5 MG Tab, Take 1 tablet (5 mg total) by mouth 3 (three) times daily as needed (anxiety)., Disp: 60 tablet, Rfl: 3    folic acid (FOLVITE) 1 MG tablet, Take 1 mg by mouth once daily., Disp: , Rfl:     prenatal vit no.124/iron/folic (PRENATAL VITAMIN ORAL), Take by mouth., Disp: , Rfl:      Review of patient's allergies indicates:  No Known Allergies    Family History: Negative for congenital heart disease, early coronary artery disease, sudden unexplained death, connective tissues disorders, genetic syndromes, multiple miscarriages or other congenital anomalies.    Social History: Ms. Nat Tong is  to the father of the baby.  The father of the baby is involved.     FETAL ECHOCARDIOGRAM (summary    Fetus labeled A on MFM evaluation. Fetus in maternal LUQ, transverse lie.   The fetal systemic and pulmoanry veins were not optimally seen.   Otherwise normal fetal echocardiogram.    Fetus labeled B on MFM evaluation. Fetus on maternal right, breech, closest to cervix. The fetus was tachycardiac in the beginning of the study in the 170-180s. The rhythm appeared to be sinus. Slowed down to low 160s at end of study. Suboptimal views of the aortic arch with normal 3 vessel view  and no indirect evidence of arch obstruction. Otherwise normal fetal echocardiogram. Results discussed with Dr. Saleem. Patient evaluated by MFM to insure fetal heart rate improved.     (Full report in electronic medical record)    Impression:  Twin active male fetus at 25 2/7 wga. Fetus B (maternal RLQ) had tachycardiac during the echo. His heart is structurally normal. He was monitored by MFM and his heart rate returned into a normal range prior to patient leaving the clinic.     Todays fetal echocardiograms are normal, within the limitations of fetal echocardiography.  I discussed with her that fetal echocardiography is insufficiently sensitive to rule out all septal defects, anomalies of pulmonary and systemic veins, arch anomalies, and some valvar abnormalities, nor can it ensure that the ductus arteriosus and foramen ovale will spontaneously close.     Recommendations:  Location, timing, and mode of delivery will be determined by the obstetrical team.  She does not require further follow-up in the fetal echocardiography clinic, but I would be happy to see her again if additional questions or concerns arise.    Should there be any concerns about the baby's heart after birth, a post-benny echocardiogram and cardiology consultation are recommended.           Jeannette Lara MD, MSCI  Pediatric Cardiology  Pediatric Echocardiography, Fetal Echocardiography, Cardiac MRI  [facility] [address] [place]  [address]  Phone [phone], Fax [phone]        The above information was discussed in detail including the use of diagrams, with 30 minutes of total face to face time, with greater than 50% with counseling and coordination of care.  The discussion of the diagnosis and treatment options is as described above.

## 2024-03-28 ENCOUNTER — PROCEDURE VISIT (OUTPATIENT)
Dept: MATERNAL FETAL MEDICINE | Facility: CLINIC | Age: 34
End: 2024-03-28
Payer: COMMERCIAL

## 2024-03-28 ENCOUNTER — OFFICE VISIT (OUTPATIENT)
Dept: MATERNAL FETAL MEDICINE | Facility: CLINIC | Age: 34
End: 2024-03-28
Payer: COMMERCIAL

## 2024-03-28 VITALS
HEART RATE: 102 BPM | BODY MASS INDEX: 25.71 KG/M2 | SYSTOLIC BLOOD PRESSURE: 119 MMHG | WEIGHT: 154.31 LBS | DIASTOLIC BLOOD PRESSURE: 73 MMHG | HEIGHT: 65 IN

## 2024-03-28 DIAGNOSIS — O30.032 MONOCHORIONIC DIAMNIOTIC TWIN GESTATION IN SECOND TRIMESTER: Primary | ICD-10-CM

## 2024-03-28 DIAGNOSIS — O30.032 MONOCHORIONIC DIAMNIOTIC TWIN GESTATION IN SECOND TRIMESTER: ICD-10-CM

## 2024-03-28 PROCEDURE — 3008F BODY MASS INDEX DOCD: CPT | Mod: CPTII,S$GLB,, | Performed by: OBSTETRICS & GYNECOLOGY

## 2024-03-28 PROCEDURE — 1160F RVW MEDS BY RX/DR IN RCRD: CPT | Mod: CPTII,S$GLB,, | Performed by: OBSTETRICS & GYNECOLOGY

## 2024-03-28 PROCEDURE — 3078F DIAST BP <80 MM HG: CPT | Mod: CPTII,S$GLB,, | Performed by: OBSTETRICS & GYNECOLOGY

## 2024-03-28 PROCEDURE — 76816 OB US FOLLOW-UP PER FETUS: CPT | Mod: S$GLB,,, | Performed by: OBSTETRICS & GYNECOLOGY

## 2024-03-28 PROCEDURE — 99213 OFFICE O/P EST LOW 20 MIN: CPT | Mod: S$GLB,,, | Performed by: OBSTETRICS & GYNECOLOGY

## 2024-03-28 PROCEDURE — 1159F MED LIST DOCD IN RCRD: CPT | Mod: CPTII,S$GLB,, | Performed by: OBSTETRICS & GYNECOLOGY

## 2024-03-28 PROCEDURE — 3074F SYST BP LT 130 MM HG: CPT | Mod: CPTII,S$GLB,, | Performed by: OBSTETRICS & GYNECOLOGY

## 2024-03-28 RX ORDER — FERRIC MALTOL 30 MG/1
1 CAPSULE ORAL DAILY
COMMUNITY
Start: 2024-03-14

## 2024-03-28 NOTE — ASSESSMENT & PLAN NOTE
Previously counseled on monochorionic twinning and reviewed the types of placentation seen and their frequency. I counseled her on the increased incidence of preeclampsia/gestational hypertension, gestational diabetes, fetal growth restriction, anemia, congenital anomalies, need for antepartum hospitalization,  labor/PPROM, stillbirth, and risk of postpartum hemorrhage that can occur in twin pregnancies. It was explained that all monochorionic twins, have a single placenta and that this puts them at risk for twin transfusion syndrome (TTTS).     24- Concordant growth with subjectively normal AFV. Stomach and bladder visualized for each twin. No evidence of TTTS  24- Baby A EFW 653g, Baby B EFW 600g; Concordant growth with subjectively normal AFV. Stomach and bladder visualized for each twin. No evidence of TTTS  3/28/24- concordant growths (10%). Normal AFV for each twin. Stomach and bladders visualized for both.     Recommendations:   Fetal ultrasound assessment every two weeks, starting at 16 weeks until delivery for TTTS surveillance (scheduled with MFM)  If high suspicion for evolving TTTS, increase ultrasound surveillance to once or twice weekly  Transvaginal cervical length screening at time of targeted anatomy ultrasound w/ MFM (completed & normal)  Detailed anatomy surveys at 18-20 weeks (started, some views suboptimal)  Fetal growth ultrasounds every 3-4 weeks starting at 23-24 weeks (scheduled with MFM)  Fetal echocardiogram at 22-24 weeks (scheduled 3/12/24)  Low-dose aspirin daily (81 mg) beginning at 12-13 weeks to reduce the risk of preeclampsia - she is taking  Folic acid 1 mg daily and ferrous sulfate 325 mg daily  Increase daily dietary intake by approximately 300 kcal above that for a mares pregnancy, or 600 kcal over that of a nonpregnant woman and monitor weight gain   testing with weekly NST and AFV assessment beginning at 32 weeks (can alternate with MFM every  other week)  Given the increased risks of a twin pregnancy, prenatal visits every 2-3 weeks from 24 - 32/34 weeks and weekly prenatal visits thereafter    Delivery timing:   Normal growth, no complications: 37 0/7 - 37 6/7 weeks gestation   Normal growth, maternal comorbidities: 36 0/7 - 36 6/7 weeks gestation   FGR of one or both: 34 0/7 - 35 6/7 weeks gestation  History of TTTS, FGR with abnormal UA Doppler, oligohydramnios, or comorbid conditions: 32 0/7 - 34 6/7 weeks gestation    Comorbid conditions include: BMI >= 40, diabetes, hypertension, and complex maternal medical conditions associated with placental dysfunction (Lupus, renal disease, or other vascular disease).

## 2024-03-28 NOTE — PROGRESS NOTES
"Maternal Fetal Medicine follow up consult    SUBJECTIVE:     Nat Tong is a 33 y.o.  female with IUP at 27w4d who is seen in follow up consultation by MFM.  Pregnancy complications include:   Problem   1. Monochorionic diamniotic twin gestation in second trimester     Nat presents for routine follow up appointment.  Denies LOF, VB, contractions. Positive fetal movement.    Previous notes reviewed.   No changes to medical, surgical, family, social, or obstetric history.    Interval history since last MFM visit: see above    Medications reviewed.    Care team members:  Dr Barber - Primary OB     OBJECTIVE:   /73 (BP Location: Right arm)   Pulse 102   Ht 5' 5" (1.651 m)   Wt 70 kg (154 lb 5.2 oz)   BMI 25.68 kg/m²     Ultrasound performed. See viewpoint for full ultrasound report.    A viable monochorionic- diamniotic pregnancy is visualized in the vertex-vertex position.   At her previous anatomy scan, fetuses were mislabeled and error has been noted. On anatomy visit, A is B and B is A.   Baby A (left, fundal/ marginal cord insertion)- No suspected abnormalities and anatomy is complete. The fetus is male.  Baby B (right, central cord insertion) has no suspected abnormalities and anatomic survey is complete. The fetus is male.   Estimated fetal weights for the fetuses are as follows: Baby A EFW 1076g, Baby B EFW 1196g. Both are appropriate for gestational age with 10% difference (concordant growth).   Amniotic fluid volume is normal for both with MVPs of 6.2cm (A) and 4.3cm (B).  Both bladders are visible, no signs of TTTS.   Placental location is anterior without evidence of previa.     ASSESSMENT/PLAN:     33 y.o.  female with IUP at 27w4d    1. Monochorionic diamniotic twin gestation in second trimester  Previously counseled on monochorionic twinning and reviewed the types of placentation seen and their frequency. I counseled her on the increased incidence of preeclampsia/gestational " hypertension, gestational diabetes, fetal growth restriction, anemia, congenital anomalies, need for antepartum hospitalization,  labor/PPROM, stillbirth, and risk of postpartum hemorrhage that can occur in twin pregnancies. It was explained that all monochorionic twins, have a single placenta and that this puts them at risk for twin transfusion syndrome (TTTS).     24- Concordant growth with subjectively normal AFV. Stomach and bladder visualized for each twin. No evidence of TTTS  24- Baby A EFW 653g, Baby B EFW 600g; Concordant growth with subjectively normal AFV. Stomach and bladder visualized for each twin. No evidence of TTTS  3/28/24- concordant growths (10%). Normal AFV for each twin. Stomach and bladders visualized for both.     Recommendations:   Fetal ultrasound assessment every two weeks, starting at 16 weeks until delivery for TTTS surveillance (scheduled with MFM)  If high suspicion for evolving TTTS, increase ultrasound surveillance to once or twice weekly  Transvaginal cervical length screening at time of targeted anatomy ultrasound w/ MFM (completed & normal)  Detailed anatomy surveys at 18-20 weeks (started, some views suboptimal)  Fetal growth ultrasounds every 3-4 weeks starting at 23-24 weeks (scheduled with MFM)  Fetal echocardiogram at 22-24 weeks (scheduled 3/12/24)  Low-dose aspirin daily (81 mg) beginning at 12-13 weeks to reduce the risk of preeclampsia - she is taking  Folic acid 1 mg daily and ferrous sulfate 325 mg daily  Increase daily dietary intake by approximately 300 kcal above that for a mares pregnancy, or 600 kcal over that of a nonpregnant woman and monitor weight gain   testing with weekly NST and AFV assessment beginning at 32 weeks (can alternate with MFM every other week)  Given the increased risks of a twin pregnancy, prenatal visits every 2-3 weeks from 24 - 32/34 weeks and weekly prenatal visits thereafter    Delivery timing:   Normal  growth, no complications: 37 0/7 - 37 6/7 weeks gestation   Normal growth, maternal comorbidities: 36 0/7 - 36 6/7 weeks gestation   FGR of one or both: 34 0/7 - 35 6/7 weeks gestation  History of TTTS, FGR with abnormal UA Doppler, oligohydramnios, or comorbid conditions: 32 0/7 - 34 6/7 weeks gestation    Comorbid conditions include: BMI >= 40, diabetes, hypertension, and complex maternal medical conditions associated with placental dysfunction (Lupus, renal disease, or other vascular disease).    F/u in 2 weeks for TTTS surveillance  F/u in 4 weeks for MFM visit/ /growth ultrasound    Christianne Saleem MD  Maternal Fetal Medicine

## 2024-04-05 DIAGNOSIS — O30.032 MONOCHORIONIC DIAMNIOTIC TWIN GESTATION IN SECOND TRIMESTER: Primary | ICD-10-CM

## 2024-04-11 ENCOUNTER — PROCEDURE VISIT (OUTPATIENT)
Dept: MATERNAL FETAL MEDICINE | Facility: CLINIC | Age: 34
End: 2024-04-11
Payer: COMMERCIAL

## 2024-04-11 VITALS — DIASTOLIC BLOOD PRESSURE: 83 MMHG | SYSTOLIC BLOOD PRESSURE: 111 MMHG | HEART RATE: 127 BPM

## 2024-04-11 DIAGNOSIS — O30.032 MONOCHORIONIC DIAMNIOTIC TWIN GESTATION IN SECOND TRIMESTER: ICD-10-CM

## 2024-04-11 PROCEDURE — 76815 OB US LIMITED FETUS(S): CPT | Mod: S$GLB,,, | Performed by: OBSTETRICS & GYNECOLOGY

## 2024-04-25 ENCOUNTER — PROCEDURE VISIT (OUTPATIENT)
Dept: MATERNAL FETAL MEDICINE | Facility: CLINIC | Age: 34
End: 2024-04-25
Payer: COMMERCIAL

## 2024-04-25 ENCOUNTER — OFFICE VISIT (OUTPATIENT)
Dept: MATERNAL FETAL MEDICINE | Facility: CLINIC | Age: 34
End: 2024-04-25
Payer: COMMERCIAL

## 2024-04-25 VITALS
SYSTOLIC BLOOD PRESSURE: 127 MMHG | HEIGHT: 65 IN | WEIGHT: 158.06 LBS | DIASTOLIC BLOOD PRESSURE: 78 MMHG | HEART RATE: 123 BPM | BODY MASS INDEX: 26.33 KG/M2

## 2024-04-25 DIAGNOSIS — O30.032 MONOCHORIONIC DIAMNIOTIC TWIN GESTATION IN SECOND TRIMESTER: ICD-10-CM

## 2024-04-25 DIAGNOSIS — O30.033 MONOCHORIONIC DIAMNIOTIC TWIN GESTATION IN THIRD TRIMESTER: Primary | ICD-10-CM

## 2024-04-25 PROCEDURE — 1159F MED LIST DOCD IN RCRD: CPT | Mod: CPTII,S$GLB,, | Performed by: OBSTETRICS & GYNECOLOGY

## 2024-04-25 PROCEDURE — 76816 OB US FOLLOW-UP PER FETUS: CPT | Mod: 59,S$GLB,, | Performed by: OBSTETRICS & GYNECOLOGY

## 2024-04-25 PROCEDURE — 3008F BODY MASS INDEX DOCD: CPT | Mod: CPTII,S$GLB,, | Performed by: OBSTETRICS & GYNECOLOGY

## 2024-04-25 PROCEDURE — 99213 OFFICE O/P EST LOW 20 MIN: CPT | Mod: S$GLB,,, | Performed by: OBSTETRICS & GYNECOLOGY

## 2024-04-25 PROCEDURE — 3074F SYST BP LT 130 MM HG: CPT | Mod: CPTII,S$GLB,, | Performed by: OBSTETRICS & GYNECOLOGY

## 2024-04-25 PROCEDURE — 1160F RVW MEDS BY RX/DR IN RCRD: CPT | Mod: CPTII,S$GLB,, | Performed by: OBSTETRICS & GYNECOLOGY

## 2024-04-25 PROCEDURE — 3078F DIAST BP <80 MM HG: CPT | Mod: CPTII,S$GLB,, | Performed by: OBSTETRICS & GYNECOLOGY

## 2024-04-25 RX ORDER — FAMOTIDINE 20 MG/1
20 TABLET, FILM COATED ORAL 2 TIMES DAILY
COMMUNITY

## 2024-04-25 NOTE — ASSESSMENT & PLAN NOTE
Previously counseled on monochorionic twinning and reviewed the types of placentation seen and their frequency. I counseled her on the increased incidence of preeclampsia/gestational hypertension, gestational diabetes, fetal growth restriction, anemia, congenital anomalies, need for antepartum hospitalization,  labor/PPROM, stillbirth, and risk of postpartum hemorrhage that can occur in twin pregnancies. It was explained that all monochorionic twins, have a single placenta and that this puts them at risk for twin transfusion syndrome (TTTS).     24- Concordant growth with subjectively normal AFV. Stomach and bladder visualized for each twin. No evidence of TTTS  24- Baby A EFW 653g, Baby B EFW 600g; Concordant growth with subjectively normal AFV. Stomach and bladder visualized for each twin. No evidence of TTTS  3/28/24- concordant growths (10%). Normal AFV for each twin. Stomach and bladders visualized for both.   24- concordant growths (8%). Normal AFV for each twin. Stomach and bladders visualized for both.     Recommendations:   Fetal ultrasound assessment every two weeks, starting at 16 weeks until delivery for TTTS surveillance (scheduled with MFM)  If high suspicion for evolving TTTS, increase ultrasound surveillance to once or twice weekly  Transvaginal cervical length screening at time of targeted anatomy ultrasound w/ MFM (completed & normal)  Detailed anatomy surveys at 18-20 weeks (started, some views suboptimal)  Fetal growth ultrasounds every 3-4 weeks starting at 23-24 weeks (scheduled with MFM)  Fetal echocardiogram at 22-24 weeks (scheduled 3/12/24)  Low-dose aspirin daily (81 mg) beginning at 12-13 weeks to reduce the risk of preeclampsia - she is taking  Folic acid 1 mg daily and ferrous sulfate 325 mg daily  Increase daily dietary intake by approximately 300 kcal above that for a mares pregnancy, or 600 kcal over that of a nonpregnant woman and monitor weight  gain   testing with weekly NST and AFV assessment beginning at 32 weeks (can alternate with MFM every other week)  Given the increased risks of a twin pregnancy, prenatal visits every 2-3 weeks from 24 - 32/34 weeks and weekly prenatal visits thereafter    Delivery timing:   Normal growth, no complications: 37 0/7 - 37 6/7 weeks gestation   Normal growth, maternal comorbidities: 36 0/7 - 36 6/7 weeks gestation   FGR of one or both: 34 0/7 - 35 6/7 weeks gestation  History of TTTS, FGR with abnormal UA Doppler, oligohydramnios, or comorbid conditions: 32 0/7 - 34 6/7 weeks gestation    Comorbid conditions include: BMI >= 40, diabetes, hypertension, and complex maternal medical conditions associated with placental dysfunction (Lupus, renal disease, or other vascular disease).

## 2024-04-25 NOTE — PROGRESS NOTES
"Maternal Fetal Medicine follow up consult    SUBJECTIVE:     Nat Cruz is a 33 y.o.  female with IUP at 31w4d who is seen in follow up consultation by MFM.  Pregnancy complications include:   Problem   - Monochorionic diamniotic twin gestation in third trimester     Nat presents for routine follow up appointment.  Denies LOF, VB, contractions. Positive fetal movement.  She says pepcid is helping her GERD and her sleep is better.     Previous notes reviewed.   No changes to medical, surgical, family, social, or obstetric history.    Interval history since last MFM visit: see above    Medications reviewed.    Care team members:  Dr Barber - Primary OB     OBJECTIVE:   /78 (BP Location: Right arm)   Pulse (!) 153   Ht 5' 5" (1.651 m)   Wt 71.7 kg (158 lb 1.1 oz)   BMI 26.30 kg/m²     Ultrasound performed. See viewpoint for full ultrasound report.    A viable monochorionic- diamniotic pregnancy is visualized in the vertex-vertex position.   At her previous anatomy scan, fetuses were mislabeled and error has been noted. On anatomy visit, A is B and B is A.   Baby A (marginal cord insertion)- No suspected abnormalities and anatomy is complete. The fetus is male.  Baby B (central cord insertion) has no suspected abnormalities and anatomic survey is complete. The fetus is male.   Estimated fetal weights for the fetuses are as follows: Baby A EFW 1565g (17%), Baby B EFW 1703g (24%). Both are appropriate for gestational age with 8% difference (concordant growth).   Amniotic fluid volume is normal for both with MVPs of 4.1cm (A) and 4.2cm (B).  Both bladders are visible, no signs of TTTS.   Placental location is anterior without evidence of previa.     ASSESSMENT/PLAN:     33 y.o.  female with IUP at 31w4d    - Monochorionic diamniotic twin gestation in third trimester  Previously counseled on monochorionic twinning and reviewed the types of placentation seen and their frequency. I counseled her on " the increased incidence of preeclampsia/gestational hypertension, gestational diabetes, fetal growth restriction, anemia, congenital anomalies, need for antepartum hospitalization,  labor/PPROM, stillbirth, and risk of postpartum hemorrhage that can occur in twin pregnancies. It was explained that all monochorionic twins, have a single placenta and that this puts them at risk for twin transfusion syndrome (TTTS).     24- Concordant growth with subjectively normal AFV. Stomach and bladder visualized for each twin. No evidence of TTTS  24- Baby A EFW 653g, Baby B EFW 600g; Concordant growth with subjectively normal AFV. Stomach and bladder visualized for each twin. No evidence of TTTS  3/28/24- concordant growths (10%). Normal AFV for each twin. Stomach and bladders visualized for both.   24- concordant growths (8%). Normal AFV for each twin. Stomach and bladders visualized for both.     Recommendations:   Fetal ultrasound assessment every two weeks, starting at 16 weeks until delivery for TTTS surveillance (scheduled with MFM)  If high suspicion for evolving TTTS, increase ultrasound surveillance to once or twice weekly  Transvaginal cervical length screening at time of targeted anatomy ultrasound w/ MFM (completed & normal)  Detailed anatomy surveys at 18-20 weeks (started, some views suboptimal)  Fetal growth ultrasounds every 3-4 weeks starting at 23-24 weeks (scheduled with MFM)  Fetal echocardiogram at 22-24 weeks (scheduled 3/12/24)  Low-dose aspirin daily (81 mg) beginning at 12-13 weeks to reduce the risk of preeclampsia - she is taking  Folic acid 1 mg daily and ferrous sulfate 325 mg daily  Increase daily dietary intake by approximately 300 kcal above that for a mares pregnancy, or 600 kcal over that of a nonpregnant woman and monitor weight gain   testing with weekly NST and AFV assessment beginning at 32 weeks (can alternate with MFM every other week)  Given the  increased risks of a twin pregnancy, prenatal visits every 2-3 weeks from 24 - 32/34 weeks and weekly prenatal visits thereafter    Delivery timing:   Normal growth, no complications: 37 0/7 - 37 6/7 weeks gestation   Normal growth, maternal comorbidities: 36 0/7 - 36 6/7 weeks gestation   FGR of one or both: 34 0/7 - 35 6/7 weeks gestation  History of TTTS, FGR with abnormal UA Doppler, oligohydramnios, or comorbid conditions: 32 0/7 - 34 6/7 weeks gestation    Comorbid conditions include: BMI >= 40, diabetes, hypertension, and complex maternal medical conditions associated with placental dysfunction (Lupus, renal disease, or other vascular disease).    F/u in 2 weeks for TTTS surveillance  F/u in 4 weeks for MFM visit /growth ultrasound    Christianne Saleem MD  Maternal Fetal Medicine

## 2024-05-01 DIAGNOSIS — O30.033 MONOCHORIONIC DIAMNIOTIC TWIN GESTATION IN THIRD TRIMESTER: Primary | ICD-10-CM

## 2024-05-08 ENCOUNTER — PROCEDURE VISIT (OUTPATIENT)
Dept: MATERNAL FETAL MEDICINE | Facility: CLINIC | Age: 34
End: 2024-05-08
Payer: COMMERCIAL

## 2024-05-08 VITALS — SYSTOLIC BLOOD PRESSURE: 118 MMHG | DIASTOLIC BLOOD PRESSURE: 88 MMHG | HEART RATE: 129 BPM

## 2024-05-08 DIAGNOSIS — O30.033 MONOCHORIONIC DIAMNIOTIC TWIN GESTATION IN THIRD TRIMESTER: ICD-10-CM

## 2024-05-08 PROCEDURE — 76819 FETAL BIOPHYS PROFIL W/O NST: CPT | Mod: 59,S$GLB,, | Performed by: OBSTETRICS & GYNECOLOGY

## 2024-05-08 PROCEDURE — 76816 OB US FOLLOW-UP PER FETUS: CPT | Mod: 59,S$GLB,, | Performed by: OBSTETRICS & GYNECOLOGY

## 2024-05-21 ENCOUNTER — HOSPITAL ENCOUNTER (INPATIENT)
Facility: HOSPITAL | Age: 34
LOS: 4 days | Discharge: HOME | End: 2024-05-25
Attending: OBSTETRICS & GYNECOLOGY | Admitting: OBSTETRICS & GYNECOLOGY
Payer: COMMERCIAL

## 2024-05-21 ENCOUNTER — ANESTHESIA (OUTPATIENT)
Dept: OBSTETRICS AND GYNECOLOGY | Facility: HOSPITAL | Age: 34
End: 2024-05-21
Payer: COMMERCIAL

## 2024-05-21 ENCOUNTER — ANESTHESIA EVENT (OUTPATIENT)
Dept: OBSTETRICS AND GYNECOLOGY | Facility: HOSPITAL | Age: 34
End: 2024-05-21
Payer: COMMERCIAL

## 2024-05-21 DIAGNOSIS — O30.003 TWIN GESTATION IN THIRD TRIMESTER: ICD-10-CM

## 2024-05-21 PROBLEM — O30.039 TWIN GESTATION, MONOCHORIONIC DIAMNIOTIC: Status: ACTIVE | Noted: 2024-05-21

## 2024-05-21 PROBLEM — O36.5931 IUGR (INTRAUTERINE GROWTH RESTRICTION) AFFECTING CARE OF MOTHER, THIRD TRIMESTER, FETUS 1: Status: ACTIVE | Noted: 2024-05-21

## 2024-05-21 LAB
ABORH RETYPE: NORMAL
BASOPHILS # BLD AUTO: 0.03 X10(3)/MCL
BASOPHILS NFR BLD AUTO: 0.3 %
EOSINOPHIL # BLD AUTO: 0.06 X10(3)/MCL (ref 0–0.9)
EOSINOPHIL NFR BLD AUTO: 0.6 %
ERYTHROCYTE [DISTWIDTH] IN BLOOD BY AUTOMATED COUNT: 15.6 % (ref 11.5–17)
GROUP & RH: NORMAL
HCT VFR BLD AUTO: 35.2 % (ref 37–47)
HGB BLD-MCNC: 11.1 G/DL (ref 12–16)
IMM GRANULOCYTES # BLD AUTO: 0.07 X10(3)/MCL (ref 0–0.04)
IMM GRANULOCYTES NFR BLD AUTO: 0.7 %
INDIRECT COOMBS: NORMAL
LYMPHOCYTES # BLD AUTO: 1.41 X10(3)/MCL (ref 0.6–4.6)
LYMPHOCYTES NFR BLD AUTO: 14.4 %
MCH RBC QN AUTO: 26.9 PG (ref 27–31)
MCHC RBC AUTO-ENTMCNC: 31.5 G/DL (ref 33–36)
MCV RBC AUTO: 85.2 FL (ref 80–94)
MONOCYTES # BLD AUTO: 1.28 X10(3)/MCL (ref 0.1–1.3)
MONOCYTES NFR BLD AUTO: 13.1 %
NEUTROPHILS # BLD AUTO: 6.92 X10(3)/MCL (ref 2.1–9.2)
NEUTROPHILS NFR BLD AUTO: 70.9 %
NRBC BLD AUTO-RTO: 0 %
PLATELET # BLD AUTO: 278 X10(3)/MCL (ref 130–400)
PMV BLD AUTO: 10.7 FL (ref 7.4–10.4)
RBC # BLD AUTO: 4.13 X10(6)/MCL (ref 4.2–5.4)
SPECIMEN OUTDATE: NORMAL
T PALLIDUM AB SER QL: NONREACTIVE
WBC # SPEC AUTO: 9.77 X10(3)/MCL (ref 4.5–11.5)

## 2024-05-21 PROCEDURE — 63600175 PHARM REV CODE 636 W HCPCS: Performed by: OBSTETRICS & GYNECOLOGY

## 2024-05-21 PROCEDURE — 86780 TREPONEMA PALLIDUM: CPT | Performed by: OBSTETRICS & GYNECOLOGY

## 2024-05-21 PROCEDURE — 96372 THER/PROPH/DIAG INJ SC/IM: CPT | Performed by: OBSTETRICS & GYNECOLOGY

## 2024-05-21 PROCEDURE — 62322 NJX INTERLAMINAR LMBR/SAC: CPT | Performed by: ANESTHESIOLOGY

## 2024-05-21 PROCEDURE — 63600175 PHARM REV CODE 636 W HCPCS

## 2024-05-21 PROCEDURE — 63600175 PHARM REV CODE 636 W HCPCS: Mod: JZ,JG | Performed by: ANESTHESIOLOGY

## 2024-05-21 PROCEDURE — 51702 INSERT TEMP BLADDER CATH: CPT

## 2024-05-21 PROCEDURE — 25000003 PHARM REV CODE 250: Performed by: OBSTETRICS & GYNECOLOGY

## 2024-05-21 PROCEDURE — 86850 RBC ANTIBODY SCREEN: CPT | Performed by: OBSTETRICS & GYNECOLOGY

## 2024-05-21 PROCEDURE — 36004724 HC OB OR TIME LEV III - 1ST 15 MIN: Performed by: OBSTETRICS & GYNECOLOGY

## 2024-05-21 PROCEDURE — 27201423 OPTIME MED/SURG SUP & DEVICES STERILE SUPPLY: Performed by: OBSTETRICS & GYNECOLOGY

## 2024-05-21 PROCEDURE — 27000492 HC SLEEVE, SCD T/L

## 2024-05-21 PROCEDURE — 25000003 PHARM REV CODE 250

## 2024-05-21 PROCEDURE — 36004725 HC OB OR TIME LEV III - EA ADD 15 MIN: Performed by: OBSTETRICS & GYNECOLOGY

## 2024-05-21 PROCEDURE — 71000033 HC RECOVERY, INTIAL HOUR: Performed by: OBSTETRICS & GYNECOLOGY

## 2024-05-21 PROCEDURE — 99900035 HC TECH TIME PER 15 MIN (STAT)

## 2024-05-21 PROCEDURE — 37000009 HC ANESTHESIA EA ADD 15 MINS: Performed by: OBSTETRICS & GYNECOLOGY

## 2024-05-21 PROCEDURE — 36415 COLL VENOUS BLD VENIPUNCTURE: CPT | Performed by: OBSTETRICS & GYNECOLOGY

## 2024-05-21 PROCEDURE — 85025 COMPLETE CBC W/AUTO DIFF WBC: CPT | Performed by: OBSTETRICS & GYNECOLOGY

## 2024-05-21 PROCEDURE — 37000008 HC ANESTHESIA 1ST 15 MINUTES: Performed by: OBSTETRICS & GYNECOLOGY

## 2024-05-21 PROCEDURE — D9220A PRA ANESTHESIA: Mod: QK,P2,ANES, | Performed by: ANESTHESIOLOGY

## 2024-05-21 PROCEDURE — D9220A PRA ANESTHESIA: Mod: QX,P2,CRNA,

## 2024-05-21 PROCEDURE — 11000001 HC ACUTE MED/SURG PRIVATE ROOM

## 2024-05-21 RX ORDER — OXYTOCIN/RINGER'S LACTATE 30/500 ML
95 PLASTIC BAG, INJECTION (ML) INTRAVENOUS ONCE
Status: DISCONTINUED | OUTPATIENT
Start: 2024-05-21 | End: 2024-05-25 | Stop reason: HOSPADM

## 2024-05-21 RX ORDER — FAMOTIDINE 10 MG/ML
20 INJECTION INTRAVENOUS
Status: DISCONTINUED | OUTPATIENT
Start: 2024-05-21 | End: 2024-05-21

## 2024-05-21 RX ORDER — MISOPROSTOL 100 UG/1
800 TABLET ORAL ONCE AS NEEDED
Status: DISCONTINUED | OUTPATIENT
Start: 2024-05-21 | End: 2024-05-25 | Stop reason: HOSPADM

## 2024-05-21 RX ORDER — AMOXICILLIN 250 MG
1 CAPSULE ORAL NIGHTLY PRN
Status: DISCONTINUED | OUTPATIENT
Start: 2024-05-21 | End: 2024-05-25 | Stop reason: HOSPADM

## 2024-05-21 RX ORDER — CEFAZOLIN SODIUM 1 G/3ML
INJECTION, POWDER, FOR SOLUTION INTRAMUSCULAR; INTRAVENOUS
Status: DISCONTINUED | OUTPATIENT
Start: 2024-05-21 | End: 2024-05-21

## 2024-05-21 RX ORDER — SIMETHICONE 80 MG
1 TABLET,CHEWABLE ORAL EVERY 6 HOURS PRN
Status: DISCONTINUED | OUTPATIENT
Start: 2024-05-21 | End: 2024-05-25 | Stop reason: HOSPADM

## 2024-05-21 RX ORDER — ACETAMINOPHEN 325 MG/1
650 TABLET ORAL EVERY 6 HOURS
OUTPATIENT
Start: 2024-05-21 | End: 2024-05-22

## 2024-05-21 RX ORDER — KETOROLAC TROMETHAMINE 30 MG/ML
30 INJECTION, SOLUTION INTRAMUSCULAR; INTRAVENOUS EVERY 6 HOURS
OUTPATIENT
Start: 2024-05-21 | End: 2024-05-22

## 2024-05-21 RX ORDER — OXYCODONE HYDROCHLORIDE 5 MG/1
10 TABLET ORAL EVERY 4 HOURS PRN
OUTPATIENT
Start: 2024-05-21 | End: 2024-05-22

## 2024-05-21 RX ORDER — MORPHINE SULFATE 1 MG/ML
INJECTION, SOLUTION EPIDURAL; INTRATHECAL; INTRAVENOUS
Status: DISCONTINUED | OUTPATIENT
Start: 2024-05-21 | End: 2024-05-21

## 2024-05-21 RX ORDER — METHYLERGONOVINE MALEATE 0.2 MG/ML
200 INJECTION INTRAVENOUS
Status: DISCONTINUED | OUTPATIENT
Start: 2024-05-21 | End: 2024-05-21

## 2024-05-21 RX ORDER — DOCUSATE SODIUM 100 MG/1
200 CAPSULE, LIQUID FILLED ORAL 2 TIMES DAILY
Status: DISCONTINUED | OUTPATIENT
Start: 2024-05-21 | End: 2024-05-25 | Stop reason: HOSPADM

## 2024-05-21 RX ORDER — ONDANSETRON HYDROCHLORIDE 2 MG/ML
4 INJECTION, SOLUTION INTRAVENOUS EVERY 6 HOURS PRN
OUTPATIENT
Start: 2024-05-21 | End: 2024-05-22

## 2024-05-21 RX ORDER — BETAMETHASONE SODIUM PHOSPHATE AND BETAMETHASONE ACETATE 3; 3 MG/ML; MG/ML
12 INJECTION, SUSPENSION INTRA-ARTICULAR; INTRALESIONAL; INTRAMUSCULAR; SOFT TISSUE ONCE
Status: COMPLETED | OUTPATIENT
Start: 2024-05-21 | End: 2024-05-21

## 2024-05-21 RX ORDER — BISACODYL 10 MG/1
10 SUPPOSITORY RECTAL ONCE AS NEEDED
Status: COMPLETED | OUTPATIENT
Start: 2024-05-21 | End: 2024-05-22

## 2024-05-21 RX ORDER — PHENYLEPHRINE HYDROCHLORIDE 10 MG/ML
INJECTION INTRAVENOUS
Status: DISCONTINUED | OUTPATIENT
Start: 2024-05-21 | End: 2024-05-21

## 2024-05-21 RX ORDER — ADHESIVE BANDAGE
30 BANDAGE TOPICAL 2 TIMES DAILY PRN
Status: DISCONTINUED | OUTPATIENT
Start: 2024-05-22 | End: 2024-05-25 | Stop reason: HOSPADM

## 2024-05-21 RX ORDER — EPHEDRINE SULFATE 50 MG/ML
10 INJECTION, SOLUTION INTRAVENOUS
OUTPATIENT
Start: 2024-05-21

## 2024-05-21 RX ORDER — OXYTOCIN/RINGER'S LACTATE 30/500 ML
95 PLASTIC BAG, INJECTION (ML) INTRAVENOUS ONCE AS NEEDED
Status: DISCONTINUED | OUTPATIENT
Start: 2024-05-21 | End: 2024-05-25 | Stop reason: HOSPADM

## 2024-05-21 RX ORDER — ONDANSETRON 4 MG/1
8 TABLET, ORALLY DISINTEGRATING ORAL EVERY 8 HOURS PRN
Status: DISCONTINUED | OUTPATIENT
Start: 2024-05-21 | End: 2024-05-25 | Stop reason: HOSPADM

## 2024-05-21 RX ORDER — PRENATAL WITH FERROUS FUM AND FOLIC ACID 3080; 920; 120; 400; 22; 1.84; 3; 20; 10; 1; 12; 200; 27; 25; 2 [IU]/1; [IU]/1; MG/1; [IU]/1; MG/1; MG/1; MG/1; MG/1; MG/1; MG/1; UG/1; MG/1; MG/1; MG/1; MG/1
1 TABLET ORAL DAILY
Status: DISCONTINUED | OUTPATIENT
Start: 2024-05-22 | End: 2024-05-25 | Stop reason: HOSPADM

## 2024-05-21 RX ORDER — BUPIVACAINE HYDROCHLORIDE 7.5 MG/ML
INJECTION, SOLUTION EPIDURAL; RETROBULBAR
Status: COMPLETED | OUTPATIENT
Start: 2024-05-21 | End: 2024-05-21

## 2024-05-21 RX ORDER — OXYTOCIN/RINGER'S LACTATE 30/500 ML
334 PLASTIC BAG, INJECTION (ML) INTRAVENOUS ONCE
Status: COMPLETED | OUTPATIENT
Start: 2024-05-21 | End: 2024-05-21

## 2024-05-21 RX ORDER — DIPHENOXYLATE HYDROCHLORIDE AND ATROPINE SULFATE 2.5; .025 MG/1; MG/1
2 TABLET ORAL EVERY 6 HOURS PRN
Status: DISCONTINUED | OUTPATIENT
Start: 2024-05-21 | End: 2024-05-25 | Stop reason: HOSPADM

## 2024-05-21 RX ORDER — DIPHENHYDRAMINE HCL 25 MG
25 CAPSULE ORAL EVERY 4 HOURS PRN
Status: DISCONTINUED | OUTPATIENT
Start: 2024-05-21 | End: 2024-05-25 | Stop reason: HOSPADM

## 2024-05-21 RX ORDER — SODIUM CITRATE AND CITRIC ACID MONOHYDRATE 334; 500 MG/5ML; MG/5ML
30 SOLUTION ORAL
Status: DISCONTINUED | OUTPATIENT
Start: 2024-05-21 | End: 2024-05-21

## 2024-05-21 RX ORDER — MISOPROSTOL 100 UG/1
800 TABLET ORAL
Status: DISCONTINUED | OUTPATIENT
Start: 2024-05-21 | End: 2024-05-21

## 2024-05-21 RX ORDER — OXYTOCIN/RINGER'S LACTATE 30/500 ML
95 PLASTIC BAG, INJECTION (ML) INTRAVENOUS ONCE
Status: DISCONTINUED | OUTPATIENT
Start: 2024-05-21 | End: 2024-05-21

## 2024-05-21 RX ORDER — EPHEDRINE SULFATE 50 MG/ML
INJECTION, SOLUTION INTRAVENOUS
Status: DISCONTINUED | OUTPATIENT
Start: 2024-05-21 | End: 2024-05-21

## 2024-05-21 RX ORDER — IBUPROFEN 800 MG/1
800 TABLET ORAL EVERY 8 HOURS
Status: DISCONTINUED | OUTPATIENT
Start: 2024-05-22 | End: 2024-05-25 | Stop reason: HOSPADM

## 2024-05-21 RX ORDER — METHYLERGONOVINE MALEATE 0.2 MG/ML
200 INJECTION INTRAVENOUS ONCE AS NEEDED
Status: DISCONTINUED | OUTPATIENT
Start: 2024-05-21 | End: 2024-05-25 | Stop reason: HOSPADM

## 2024-05-21 RX ORDER — OXYTOCIN/RINGER'S LACTATE 30/500 ML
334 PLASTIC BAG, INJECTION (ML) INTRAVENOUS ONCE AS NEEDED
Status: DISCONTINUED | OUTPATIENT
Start: 2024-05-21 | End: 2024-05-25 | Stop reason: HOSPADM

## 2024-05-21 RX ORDER — PROCHLORPERAZINE EDISYLATE 5 MG/ML
5 INJECTION INTRAMUSCULAR; INTRAVENOUS EVERY 6 HOURS PRN
OUTPATIENT
Start: 2024-05-21

## 2024-05-21 RX ORDER — SODIUM CITRATE AND CITRIC ACID MONOHYDRATE 334; 500 MG/5ML; MG/5ML
30 SOLUTION ORAL ONCE
Status: DISCONTINUED | OUTPATIENT
Start: 2024-05-21 | End: 2024-05-21

## 2024-05-21 RX ORDER — KETOROLAC TROMETHAMINE 30 MG/ML
INJECTION, SOLUTION INTRAMUSCULAR; INTRAVENOUS
Status: DISCONTINUED | OUTPATIENT
Start: 2024-05-21 | End: 2024-05-21

## 2024-05-21 RX ORDER — ONDANSETRON HYDROCHLORIDE 2 MG/ML
INJECTION, SOLUTION INTRAVENOUS
Status: DISCONTINUED | OUTPATIENT
Start: 2024-05-21 | End: 2024-05-21

## 2024-05-21 RX ORDER — OXYCODONE AND ACETAMINOPHEN 5; 325 MG/1; MG/1
1 TABLET ORAL EVERY 4 HOURS PRN
Status: DISCONTINUED | OUTPATIENT
Start: 2024-05-21 | End: 2024-05-25 | Stop reason: HOSPADM

## 2024-05-21 RX ORDER — ACETAMINOPHEN 10 MG/ML
INJECTION, SOLUTION INTRAVENOUS
Status: DISCONTINUED | OUTPATIENT
Start: 2024-05-21 | End: 2024-05-21

## 2024-05-21 RX ORDER — CARBOPROST TROMETHAMINE 250 UG/ML
250 INJECTION, SOLUTION INTRAMUSCULAR
Status: DISCONTINUED | OUTPATIENT
Start: 2024-05-21 | End: 2024-05-25 | Stop reason: HOSPADM

## 2024-05-21 RX ORDER — OXYCODONE HYDROCHLORIDE 5 MG/1
5 TABLET ORAL EVERY 4 HOURS PRN
OUTPATIENT
Start: 2024-05-21 | End: 2024-05-22

## 2024-05-21 RX ORDER — CARBOPROST TROMETHAMINE 250 UG/ML
250 INJECTION, SOLUTION INTRAMUSCULAR
Status: DISCONTINUED | OUTPATIENT
Start: 2024-05-21 | End: 2024-05-21

## 2024-05-21 RX ORDER — SODIUM CHLORIDE, SODIUM LACTATE, POTASSIUM CHLORIDE, CALCIUM CHLORIDE 600; 310; 30; 20 MG/100ML; MG/100ML; MG/100ML; MG/100ML
INJECTION, SOLUTION INTRAVENOUS CONTINUOUS
Status: DISCONTINUED | OUTPATIENT
Start: 2024-05-21 | End: 2024-05-21

## 2024-05-21 RX ORDER — OXYCODONE AND ACETAMINOPHEN 10; 325 MG/1; MG/1
1 TABLET ORAL EVERY 4 HOURS PRN
Status: DISCONTINUED | OUTPATIENT
Start: 2024-05-21 | End: 2024-05-25 | Stop reason: HOSPADM

## 2024-05-21 RX ORDER — OXYTOCIN 10 [USP'U]/ML
10 INJECTION, SOLUTION INTRAMUSCULAR; INTRAVENOUS ONCE AS NEEDED
Status: DISCONTINUED | OUTPATIENT
Start: 2024-05-21 | End: 2024-05-25 | Stop reason: HOSPADM

## 2024-05-21 RX ORDER — FENTANYL CITRATE 50 UG/ML
INJECTION, SOLUTION INTRAMUSCULAR; INTRAVENOUS
Status: DISCONTINUED | OUTPATIENT
Start: 2024-05-21 | End: 2024-05-21

## 2024-05-21 RX ORDER — HYDROMORPHONE HYDROCHLORIDE 2 MG/ML
1 INJECTION, SOLUTION INTRAMUSCULAR; INTRAVENOUS; SUBCUTANEOUS EVERY 4 HOURS PRN
Status: DISCONTINUED | OUTPATIENT
Start: 2024-05-21 | End: 2024-05-25 | Stop reason: HOSPADM

## 2024-05-21 RX ORDER — KETOROLAC TROMETHAMINE 30 MG/ML
30 INJECTION, SOLUTION INTRAMUSCULAR; INTRAVENOUS EVERY 8 HOURS
Status: COMPLETED | OUTPATIENT
Start: 2024-05-21 | End: 2024-05-22

## 2024-05-21 RX ADMIN — SODIUM CHLORIDE, POTASSIUM CHLORIDE, SODIUM LACTATE AND CALCIUM CHLORIDE 1000 ML: 600; 310; 30; 20 INJECTION, SOLUTION INTRAVENOUS at 12:05

## 2024-05-21 RX ADMIN — ONDANSETRON 4 MG: 2 INJECTION INTRAMUSCULAR; INTRAVENOUS at 03:05

## 2024-05-21 RX ADMIN — PHENYLEPHRINE HYDROCHLORIDE 200 MCG: 10 INJECTION INTRAVENOUS at 04:05

## 2024-05-21 RX ADMIN — PHENYLEPHRINE HYDROCHLORIDE 150 MCG: 10 INJECTION INTRAVENOUS at 03:05

## 2024-05-21 RX ADMIN — Medication 500 MILLI-UNITS/MIN: at 03:05

## 2024-05-21 RX ADMIN — ONDANSETRON 4 MG: 2 INJECTION INTRAMUSCULAR; INTRAVENOUS at 04:05

## 2024-05-21 RX ADMIN — DOCUSATE SODIUM 200 MG: 100 CAPSULE, LIQUID FILLED ORAL at 09:05

## 2024-05-21 RX ADMIN — CEFAZOLIN 2 G: 330 INJECTION, POWDER, FOR SOLUTION INTRAMUSCULAR; INTRAVENOUS at 03:05

## 2024-05-21 RX ADMIN — KETOROLAC TROMETHAMINE 30 MG: 30 INJECTION, SOLUTION INTRAMUSCULAR; INTRAVENOUS at 04:05

## 2024-05-21 RX ADMIN — PHENYLEPHRINE HYDROCHLORIDE 100 MCG: 10 INJECTION INTRAVENOUS at 04:05

## 2024-05-21 RX ADMIN — BUPIVACAINE HYDROCHLORIDE 1.8 ML: 7.5 INJECTION, SOLUTION EPIDURAL; RETROBULBAR at 03:05

## 2024-05-21 RX ADMIN — KETOROLAC TROMETHAMINE 30 MG: 30 INJECTION, SOLUTION INTRAMUSCULAR at 09:05

## 2024-05-21 RX ADMIN — SODIUM CHLORIDE, POTASSIUM CHLORIDE, SODIUM LACTATE AND CALCIUM CHLORIDE: 600; 310; 30; 20 INJECTION, SOLUTION INTRAVENOUS at 03:05

## 2024-05-21 RX ADMIN — FENTANYL CITRATE 10 MCG: 50 INJECTION, SOLUTION INTRAMUSCULAR; INTRAVENOUS at 03:05

## 2024-05-21 RX ADMIN — MORPHINE SULFATE 0.2 MG: 1 INJECTION, SOLUTION EPIDURAL; INTRATHECAL; INTRAVENOUS at 03:05

## 2024-05-21 RX ADMIN — PHENYLEPHRINE HYDROCHLORIDE 100 MCG: 10 INJECTION INTRAVENOUS at 03:05

## 2024-05-21 RX ADMIN — ACETAMINOPHEN 1000 MG: 10 INJECTION, SOLUTION INTRAVENOUS at 04:05

## 2024-05-21 RX ADMIN — BETAMETHASONE ACETATE AND BETAMETHASONE SODIUM PHOSPHATE 12 MG: 3; 3 INJECTION, SUSPENSION INTRA-ARTICULAR; INTRALESIONAL; INTRAMUSCULAR; SOFT TISSUE at 11:05

## 2024-05-21 RX ADMIN — EPHEDRINE SULFATE 25 MG: 50 INJECTION INTRAVENOUS at 03:05

## 2024-05-21 RX ADMIN — SODIUM CITRATE AND CITRIC ACID MONOHYDRATE 30 ML: 500; 334 SOLUTION ORAL at 03:05

## 2024-05-21 NOTE — H&P
HISTORY AND PHYSICAL                                                OBSTETRICS          Subjective:      Nat Cruz is a 33 y.o.  female with mono/di IUP at 35w2d weeks gestation who presents to L&D for observation. She was noted to have nonreassuring testing in office.  Baby A with BPP 2 and B .  Baby A was noted to have decelerations upon presentation to hospital. Care this pregnancy has been with Dr. Barber    PMHx:   Past Medical History:   Diagnosis Date    Gastritis        PSHx:   Past Surgical History:   Procedure Laterality Date    WISDOM TOOTH EXTRACTION         All: Review of patient's allergies indicates:  No Known Allergies    Meds:   Medications Prior to Admission   Medication Sig Dispense Refill Last Dose    ACCRUFER 30 mg Cap Take 1 capsule by mouth once daily.       aspirin (ECOTRIN) 81 MG EC tablet Take 81 mg by mouth once daily.       prenatal vit no.124/iron/folic (PRENATAL VITAMIN ORAL) Take by mouth.       busPIRone (BUSPAR) 5 MG Tab Take 1 tablet (5 mg total) by mouth 3 (three) times daily as needed (anxiety). 60 tablet 3     famotidine (PEPCID) 20 MG tablet Take 20 mg by mouth 2 (two) times daily.       folic acid (FOLVITE) 1 MG tablet Take 1 mg by mouth once daily.          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 [employer]   Tobacco Use    Smoking status: Never    Smokeless tobacco: Never   Substance and Sexual Activity    Alcohol use: Not Currently    Drug use: Never    Sexual activity: Yes     Partners: Male       FH:   Family History   Problem Relation Name Age of Onset    Hyperlipidemia Mother      Hypertension Father         OBHx:   OB History    Para Term  AB Living   1 0 0 0 0 0   SAB IAB Ectopic Multiple Live Births   0 0 0 0 0      # Outcome Date GA Lbr Jesus Alberto/2nd Weight Sex Type Anes PTL Lv   1 Current                Objective:      /86    Pulse (!) 117   SpO2 98%   Breastfeeding No   There is no height or weight on file to calculate BMI.    General:   alert and cooperative   HEENT:  normocephalic, atraumatic   Lungs:   clear to auscultation bilaterally   Heart:   regular rate and rhythm, S1, S2 normal   Abdomen:  gravid, non-tender   Extremities non-tender, no edema   Derm: no rashes or lesions   Psych: appropriate mood and affect   Pelvis:  adequate         Lab Review  GBS: unknown      Assessment:     33 y.o.  at 35w2d weeks gestation.  Mono/Di Twin IUP  Nonreassuring testing of baby A   IUGR of Baby A    There are no hospital problems to display for this patient.         Plan:     1. Risks, benefits, alternatives and possible complications have been discussed in detail with the patient. All questions have been answered, and Ms. Cruz has voiced understanding and agrees to the treatment plan.  2. Consents signed and in chart  3. Admit to Labor and Delivery unit  4. To OR for 1LTCS - r/b/a reviewed and questions answered.

## 2024-05-21 NOTE — PLAN OF CARE
"  Problem: Adult Inpatient Plan of Care  Goal: Plan of Care Review  Outcome: Progressing  Goal: Patient-Specific Goal (Individualized)  Outcome: Progressing  Flowsheets (Taken 2024 4471)  Patient/Family-Specific Goals (Include Timeframe): "i want to go see my babies in NICU"  Goal: Absence of Hospital-Acquired Illness or Injury  Outcome: Progressing  Goal: Optimal Comfort and Wellbeing  Outcome: Progressing  Goal: Readiness for Transition of Care  Outcome: Progressing     Problem:  Fall Injury Risk  Goal: Absence of Fall, Infant Drop and Related Injury  Outcome: Progressing     Problem: Infection  Goal: Absence of Infection Signs and Symptoms  Outcome: Progressing     Problem: Wound  Goal: Optimal Coping  Outcome: Progressing  Goal: Optimal Functional Ability  Outcome: Progressing  Goal: Absence of Infection Signs and Symptoms  Outcome: Progressing  Goal: Improved Oral Intake  Outcome: Progressing  Goal: Optimal Pain Control and Function  Outcome: Progressing  Goal: Skin Health and Integrity  Outcome: Progressing  Goal: Optimal Wound Healing  Outcome: Progressing     "

## 2024-05-21 NOTE — ANESTHESIA PREPROCEDURE EVALUATION
2024  Nat Cruz is a 33 y.o., female,  female with mono/di IUP at 35w2d weeks gestation who presents to L&D for observation. She was noted to have nonreassuring testing in office.  Baby A with BPP 2 and B .  Baby A was noted to have decelerations upon presentation to hospital. Baby A : IUGR. Pt presents for a primary C/S.    LAB:      Pre-op Assessment    I have reviewed the Patient Summary Reports.     I have reviewed the Nursing Notes. I have reviewed the NPO Status.   I have reviewed the Medications.     Review of Systems  Anesthesia Hx:  No problems with previous Anesthesia             Denies Family Hx of Anesthesia complications.    Denies Personal Hx of Anesthesia complications.                    Cardiovascular:  Cardiovascular Normal                                            Pulmonary:  Pulmonary Normal                       Endocrine:  Endocrine Normal                Physical Exam  General: Alert and Oriented    Airway:  Mallampati: II   Mouth Opening: Normal  TM Distance: Normal  Tongue: Normal  Neck ROM: Normal ROM    Dental:  Intact    Chest/Lungs:  Normal Respiratory Rate    Heart:  Rate: Normal  Rhythm: Regular Rhythm        Anesthesia Plan  Type of Anesthesia, risks & benefits discussed:    Anesthesia Type: Spinal  Intra-op Monitoring Plan: Standard ASA Monitors  Post Op Pain Control Plan: intrathecal opioid  Informed Consent: Informed consent signed with the Patient and all parties understand the risks and agree with anesthesia plan.  All questions answered. Patient consented to blood products? Yes  ASA Score: 2  Day of Surgery Review of History & Physical: H&P Update referred to the surgeon/provider.  Anesthesia Plan Notes: LR Bolus prior to spinal block.  Plan for antiemetics during spinal placement and IV ofirmev after fetal delivery.    Ready For Surgery From  Anesthesia Perspective.     .

## 2024-05-21 NOTE — ANESTHESIA PROCEDURE NOTES
Spinal    Diagnosis: Osteoarthritis  Patient location during procedure: OB  Start time: 5/21/2024 3:33 PM  Timeout: 5/21/2024 3:30 PM  End time: 5/21/2024 3:37 PM    Staffing  Authorizing Provider: Neil Dinero MD  Performing Provider: Neil Dinero MD    Staffing  Performed by: Neil Dinero MD  Authorized by: Neil Dinero MD    Preanesthetic Checklist  Completed: patient identified, IV checked, site marked, risks and benefits discussed, surgical consent, monitors and equipment checked, pre-op evaluation and timeout performed  Spinal Block  Patient position: sitting  Prep: ChloraPrep  Patient monitoring: heart rate, cardiac monitor, continuous pulse ox and frequent blood pressure checks  Approach: midline  Location: L3-4  Injection technique: single shot  CSF Fluid: clear free-flowing CSF  Needle  Needle type: pencil-tip   Needle gauge: 25 G  Needle length: 3.5 in  Additional Documentation: incremental injection, no paresthesia on injection and negative aspiration for heme  Needle localization: anatomical landmarks  Assessment  Sensory level: T7   Dermatomal levels determined by pinch or prick  Ease of block: easy  Patient's tolerance of the procedure: comfortable throughout block  Medications:    Medications: bupivacaine (pf) (MARCAINE) injection 0.75% - Intraspinal   1.8 mL - 5/21/2024 3:37:00 PM

## 2024-05-21 NOTE — TRANSFER OF CARE
Anesthesia Transfer of Care Note    Patient: Nat Cruz    Procedure(s) Performed: Procedure(s) (LRB):   SECTION (N/A)    Patient location: Labor and Delivery    Anesthesia Type: spinal    Transport from OR: Transported from OR on room air with adequate spontaneous ventilation    Post pain: adequate analgesia    Post assessment: no apparent anesthetic complications    Post vital signs: stable    Level of consciousness: awake, alert and oriented    Nausea/Vomiting: no nausea/vomiting    Complications: none    Transfer of care protocol was followed      Last vitals: Visit Vitals  /86   Pulse (!) 117   SpO2 98%   Breastfeeding No

## 2024-05-21 NOTE — ANESTHESIA POSTPROCEDURE EVALUATION
Anesthesia Post Evaluation    Patient: Nat Cruz    Procedure(s) Performed: Procedure(s) (LRB):   SECTION (N/A)    Final Anesthesia Type: spinal      Patient location during evaluation: PACU  Patient participation: Yes- Able to Participate  Level of consciousness: oriented and awake  Post-procedure vital signs: reviewed and stable  Pain management: adequate  Airway patency: patent    PONV status at discharge: No PONV  Anesthetic complications: no      Cardiovascular status: stable and hemodynamically stable  Respiratory status: spontaneous ventilation and unassisted  Hydration status: euvolemic  Follow-up not needed.  Comments: [facility]        NEURO: Neuraxial block resolving.      Vitals Value Taken Time   /82 24 1705   Temp 36.4 °C (97.5 °F) 24 1635   Pulse 103 24 1705   Resp 18 24 1705   SpO2 98 % 24 1650         Event Time   Out of Recovery 17:35:00         Pain/Johnny Score: Johnny Score: 9 (2024  5:05 PM)

## 2024-05-21 NOTE — L&D DELIVERY NOTE
Pre-op Diagnosis:   1.  Monochorionic/diamniotic Pregnancy at 35 2/7 weeks  2.  IUGR Baby A  3. Nonreassuring fetal testing Baby A    Postop Diagnosis: Same  Procedure: Primary Low Transverse  Section via Pfannenstiel incision  Surgeon: Chelly Crowder MD  Anesthesia: Spinal  Complications: None  QBL: per RN  Findings: Normal uterus, tubes and ovaries.  A: Viable male infant weighing 5lbs 1oz B: viable male infant weighing 6lbs 4oz  Specimen: Placenta    Indication and Consent: Patient presented to L&D for observation after BPP  noted for Baby A in office.  Baby A had minimal variability and occasional decelerations.  Decision made to proceed with delivery. I discussed risks, benefits and options with her in detail.  She desired to proceed with a primary  delivery. The patient understood that the risks of  section include, but are not limited to, visceral or vascular injury, infection, blood loss and the need for transfusion, prolonged hospitalization and reoperation.  The patient stated understanding and desired to proceed.  All questions were answered.     Procedure: She was taken to the operating room where epidural anesthesia was found to be adequate.  Ancef and Azithro was given for infection prophylaxis.  She was prepped and draped in the dorsal supine position with a leftward tilt.  A Pfannenstiel skin incision was made with the scalpel and carried down to the fascia with the Bovie.  The fascia was incised and extended laterally with the Bovie.  The superior aspect of the fascia was grasped with the Kocher clamps.  The underlying rectus muscle was dissected off sharply with Shankar scissors.  In a similar fashion, the inferior aspect of the fascia was elevated with the Kocher clamps and the rectus and pyramidalis muscles were dissected off.  Hemostasis was achieved with the Bovie.  The rectus muscle was  in the midline down to the level of the pubic symphysis.  Preperitoneal  fatty tissue was bluntly dissected to expose the peritoneum.  The peritoneum was found to be free of adherent bowel and entered sharply with scissors.  The peritoneal incision was extended superiorly and inferiorly to the bladder reflection with good visualization of the bladder.  The Silverio retractor was placed. The vesicouterine peritoneum identified, then opened with scissors and the bladder flap was developed.   The lower uterine segment was incised with a scalpel.  The amniotic sac was ruptured and clear fluid was noted.  The uterine incision was extended bluntly with lateral and upward traction.  Fetus A was in cephalic presentation.  The head was gently elevated out of the pelvis and gentle fundal pressure was applied once the head was brought to the incision.  The infant was delivered without difficulty.  The mouth and nose were suctioned with bulb suction.  The cord was clamped and cut.  The infant was handed off to waiting NICU and respiratory personnel.  Fetus B was also in cephalic presentation, amniotic sac ruptured with clear fluid noted.  Baby B delivered in similar fashion without difficulty. IV Pitocin was initiated to facilitate uterine contractions.  The placenta was delivered intact with manual massage of the uterine fundus.  The inside of the uterus was gently wiped with a lap sponge to assure complete removal of placental membranes.  The uterine incision was closed with a 0 Vicryl suture in a running locked fashion.  Blood clots and fluid were wiped out of the abdomen and pelvis with moist lap sponges.  The uterine incision was reinspected and good hemostasis was noted.  The Silverio retractor was removed.   The peritoneum was closed in a running fashion. The rectus muscles were loosely reapproximated.  The fascia was closed with 1-0 Vicryl in a continuous running fashion.  The subcuticular tissue was irrigated with warm normal saline.  Subcuticular tissue was reapproximated with plain gut.    Skin was closed using Monocryl in a subcuticular fashion.  The patient tolerated the procedure well.  All sponge and lap counts were correct times 2.  The patient was taken to the recovery room in stable condition.

## 2024-05-22 LAB
BASOPHILS # BLD AUTO: 0.03 X10(3)/MCL
BASOPHILS NFR BLD AUTO: 0.2 %
EOSINOPHIL # BLD AUTO: 0 X10(3)/MCL (ref 0–0.9)
EOSINOPHIL NFR BLD AUTO: 0 %
ERYTHROCYTE [DISTWIDTH] IN BLOOD BY AUTOMATED COUNT: 15.5 % (ref 11.5–17)
HCT VFR BLD AUTO: 22.7 % (ref 37–47)
HGB BLD-MCNC: 7.1 G/DL (ref 12–16)
IMM GRANULOCYTES # BLD AUTO: 0.09 X10(3)/MCL (ref 0–0.04)
IMM GRANULOCYTES NFR BLD AUTO: 0.6 %
LYMPHOCYTES # BLD AUTO: 1.3 X10(3)/MCL (ref 0.6–4.6)
LYMPHOCYTES NFR BLD AUTO: 8.5 %
MCH RBC QN AUTO: 26.3 PG (ref 27–31)
MCHC RBC AUTO-ENTMCNC: 31.3 G/DL (ref 33–36)
MCV RBC AUTO: 84.1 FL (ref 80–94)
MONOCYTES # BLD AUTO: 1.66 X10(3)/MCL (ref 0.1–1.3)
MONOCYTES NFR BLD AUTO: 10.8 %
NEUTROPHILS # BLD AUTO: 12.23 X10(3)/MCL (ref 2.1–9.2)
NEUTROPHILS NFR BLD AUTO: 79.9 %
NRBC BLD AUTO-RTO: 0 %
PLATELET # BLD AUTO: 203 X10(3)/MCL (ref 130–400)
PMV BLD AUTO: 11.1 FL (ref 7.4–10.4)
RBC # BLD AUTO: 2.7 X10(6)/MCL (ref 4.2–5.4)
WBC # SPEC AUTO: 15.31 X10(3)/MCL (ref 4.5–11.5)

## 2024-05-22 PROCEDURE — 85025 COMPLETE CBC W/AUTO DIFF WBC: CPT | Performed by: OBSTETRICS & GYNECOLOGY

## 2024-05-22 PROCEDURE — 36415 COLL VENOUS BLD VENIPUNCTURE: CPT | Performed by: OBSTETRICS & GYNECOLOGY

## 2024-05-22 PROCEDURE — 25000003 PHARM REV CODE 250: Performed by: OBSTETRICS & GYNECOLOGY

## 2024-05-22 PROCEDURE — 63600175 PHARM REV CODE 636 W HCPCS: Performed by: OBSTETRICS & GYNECOLOGY

## 2024-05-22 PROCEDURE — 11000001 HC ACUTE MED/SURG PRIVATE ROOM

## 2024-05-22 RX ADMIN — KETOROLAC TROMETHAMINE 30 MG: 30 INJECTION, SOLUTION INTRAMUSCULAR at 01:05

## 2024-05-22 RX ADMIN — BISACODYL 10 MG: 10 SUPPOSITORY RECTAL at 01:05

## 2024-05-22 RX ADMIN — DOCUSATE SODIUM 200 MG: 100 CAPSULE, LIQUID FILLED ORAL at 08:05

## 2024-05-22 RX ADMIN — SIMETHICONE 80 MG: 80 TABLET, CHEWABLE ORAL at 09:05

## 2024-05-22 RX ADMIN — IBUPROFEN 800 MG: 800 TABLET, FILM COATED ORAL at 09:05

## 2024-05-22 RX ADMIN — DOCUSATE SODIUM 200 MG: 100 CAPSULE, LIQUID FILLED ORAL at 09:05

## 2024-05-22 RX ADMIN — KETOROLAC TROMETHAMINE 30 MG: 30 INJECTION, SOLUTION INTRAMUSCULAR at 05:05

## 2024-05-22 RX ADMIN — OXYCODONE HYDROCHLORIDE AND ACETAMINOPHEN 1 TABLET: 5; 325 TABLET ORAL at 09:05

## 2024-05-22 RX ADMIN — SIMETHICONE 80 MG: 80 TABLET, CHEWABLE ORAL at 08:05

## 2024-05-22 RX ADMIN — OXYCODONE HYDROCHLORIDE AND ACETAMINOPHEN 1 TABLET: 5; 325 TABLET ORAL at 04:05

## 2024-05-22 RX ADMIN — PRENATAL VITAMINS-IRON FUMARATE 27 MG IRON-FOLIC ACID 0.8 MG TABLET 1 TABLET: at 09:05

## 2024-05-22 RX ADMIN — MAGNESIUM HYDROXIDE 2400 MG: 400 SUSPENSION ORAL at 06:05

## 2024-05-22 NOTE — PROGRESS NOTES
PostPartum Progress Note        Subjective:      Post-Operative Day #1 after  delivery secondary to twin gestation with non reassuring FHT baby A .    Patient is without complaints. Lochia decreasing.  Pumping for babies in NICU.  Pain is well controlled. Patient is ambulating. Tolerating Full Regular diet.Overall mother and babies are doing well. She denies any weakness, dizziness or light headed feelings this AM with ambulation.     Objective:      Temp:  [97.1 °F (36.2 °C)-98.4 °F (36.9 °C)] 98.4 °F (36.9 °C)  Pulse:  [0-127] 108  Resp:  [18-20] 20  SpO2:  [97 %-100 %] 98 %  BP: (113-144)/(71-94) 118/85    Intake/Output Summary (Last 24 hours) at 2024 0815  Last data filed at 2024 0020  Gross per 24 hour   Intake 900 ml   Output 1080 ml   Net -180 ml     There is no height or weight on file to calculate BMI.    General: no acute distress  Abdomen: soft, non-tender, non-distended; Fundus firm and at the umbilicus  Incision- intact, healing well, no sign of infection  Extremities: non-tender, symmetric, trace edema    Group & Rh   Date Value Ref Range Status   2024 O POS  Final     Recent Results (from the past 336 hour(s))   CBC with Differential    Collection Time: 24  6:24 AM   Result Value Ref Range    WBC 15.31 (H) 4.50 - 11.50 x10(3)/mcL    Hgb 7.1 (L) 12.0 - 16.0 g/dL    Hct 22.7 (L) 37.0 - 47.0 %    Platelet 203 130 - 400 x10(3)/mcL   CBC with Differential    Collection Time: 24 12:09 PM   Result Value Ref Range    WBC 9.77 4.50 - 11.50 x10(3)/mcL    Hgb 11.1 (L) 12.0 - 16.0 g/dL    Hct 35.2 (L) 37.0 - 47.0 %    Platelet 278 130 - 400 x10(3)/mcL          Assessment:     33 y.o.  S/P  Delivery Post-Operative Day #1  - Doing Well      Plan:     1. Continue routine postpartum care  2. Advised pt to continue PNV and accrufer iron supplement, she has been anemic during the pregnancy and expected decrease in H&H post op noted today.  3. Shower after breakfast  and remove dressing, keep open to air.  4. Plan for D/C  in 3 days

## 2024-05-22 NOTE — PROGRESS NOTES
Copied from baby's chart:      .. Admit Assessment    Patient Identification  A Boy Nta Cruz   :  2023  Admit Date:  2024  Attending Provider:  Ricardo Tijerina Jr.,*              Referral:   Pt was admitted to NICU with a diagnosis of <principal problem not specified>, and was admitted this hospital stay due to Respiratory distress of  [P22.9].   is involved and patient was referred to the Social Work Department via Routine NICU consult.        Verified her face sheet information:      Living Situation:      Resides at [address] [address] [facility] [address], phone: [phone] (home).           Met with mother and FOB/ in postpartum room, parents were appropriate and appeared supportive of each other. Baby A's Name: Zachary Cruz; Baby B's Name: Celestine Cruz. FOB/: Doron Cunninghamhaylee. Mother reports to living at above confirmed address with FOB/, they report that the twins are their first children. [facility] along with parent resource packet.       OB: Elisabeth/Stiven  Pedi: Dr Best     Confirmed Supplies: confirmed, has two carseats and two cribs    History/Current Symptoms of Anxiety/Depression: mom reports hx of anxiety, reports rx PRN Buspar by Dr Saleem that she reports is working for her at current   Discussed PPD and identifying symptoms and provided mom with PPD counseling resources and symptom brochure.       Identified Support:  FOB/, both of their parents and sisters     History/Current Substance Use: no indications      Indications of Abuse/Neglect:   no indications         Emotional/Behavioral/Cognitive Issues: none present at time of assessment      Current RX Prescriptions: buspar     Adequate Discharge/Visiting Transportation: confirmed      MAMIE Signed/Filed: n/a     Qualifies:   Early steps: no  SSI: no     NICU Assessment completed in Flowsheets: yes            Plan: will  follow family throughout baby's stay in NICU for any needs.         24 1421   NICU Assessment   Assessment Type Discharge Planning Assessment   Source of Information family   Verified Demographic and Insurance Information Yes   Insurance Commercial   Commercial [facility]   Lives With mother;father;brother   Name(s) of People in Home Nat Cruz, Doron Nnacy, Zachary Nancy, Celestine Andujartawana   Number people in home 4 including  twins   Relationship Status of Parents    Primary Source of Support/Comfort parent   Other children (include names and ages) Celestine Andujartawana (twin brother)   Father's Involvement Fully Involved   Is Father signing the birth certificate Yes   Father Name and  Doron Cunninghamhaylee   Family Involvement High   Primary Contact Name and Number Nat Cruz [phone]   Other Contacts Names and Numbers Doron Cruz [phone]   Infant Feeding Plan breastfeeding   Previous Breastfeeding Experience no   Breast Pump Needed no   Does baby have crib or safe sleep space? Yes   Do you have a car seat? Yes   Resource/Environmental Concerns none   Environment Concerns none   Resources/Education Provided Preparing for Your Baby's Discharge Home;Support Resources for NICU Families;Post Partum Depression   DME Needed Upon Discharge  none   DCFS No indications (Indicators for Report)   Discharge Plan A Home with family

## 2024-05-22 NOTE — PLAN OF CARE
Problem: Adult Inpatient Plan of Care  Goal: Plan of Care Review  Outcome: Progressing  Goal: Patient-Specific Goal (Individualized)  Outcome: Progressing  Goal: Absence of Hospital-Acquired Illness or Injury  Outcome: Progressing  Goal: Optimal Comfort and Wellbeing  Outcome: Progressing  Goal: Readiness for Transition of Care  Outcome: Progressing     Problem:  Fall Injury Risk  Goal: Absence of Fall, Infant Drop and Related Injury  Outcome: Progressing     Problem: Infection  Goal: Absence of Infection Signs and Symptoms  Outcome: Progressing     Problem: Wound  Goal: Optimal Coping  Outcome: Progressing  Goal: Optimal Functional Ability  Outcome: Progressing  Goal: Absence of Infection Signs and Symptoms  Outcome: Progressing  Goal: Improved Oral Intake  Outcome: Progressing  Goal: Optimal Pain Control and Function  Outcome: Progressing  Goal: Skin Health and Integrity  Outcome: Progressing  Goal: Optimal Wound Healing  Outcome: Progressing

## 2024-05-23 PROCEDURE — 11000001 HC ACUTE MED/SURG PRIVATE ROOM

## 2024-05-23 PROCEDURE — 25000003 PHARM REV CODE 250

## 2024-05-23 PROCEDURE — 25000003 PHARM REV CODE 250: Performed by: OBSTETRICS & GYNECOLOGY

## 2024-05-23 RX ORDER — BISACODYL 10 MG/1
10 SUPPOSITORY RECTAL DAILY PRN
Status: DISCONTINUED | OUTPATIENT
Start: 2024-05-23 | End: 2024-05-25 | Stop reason: HOSPADM

## 2024-05-23 RX ADMIN — MAGNESIUM HYDROXIDE 2400 MG: 400 SUSPENSION ORAL at 04:05

## 2024-05-23 RX ADMIN — BISACODYL 10 MG: 10 SUPPOSITORY RECTAL at 10:05

## 2024-05-23 RX ADMIN — IBUPROFEN 800 MG: 800 TABLET, FILM COATED ORAL at 06:05

## 2024-05-23 RX ADMIN — PRENATAL VITAMINS-IRON FUMARATE 27 MG IRON-FOLIC ACID 0.8 MG TABLET 1 TABLET: at 08:05

## 2024-05-23 RX ADMIN — DOCUSATE SODIUM 200 MG: 100 CAPSULE, LIQUID FILLED ORAL at 10:05

## 2024-05-23 RX ADMIN — IBUPROFEN 800 MG: 800 TABLET, FILM COATED ORAL at 02:05

## 2024-05-23 RX ADMIN — OXYCODONE HYDROCHLORIDE AND ACETAMINOPHEN 1 TABLET: 5; 325 TABLET ORAL at 10:05

## 2024-05-23 RX ADMIN — SIMETHICONE 80 MG: 80 TABLET, CHEWABLE ORAL at 06:05

## 2024-05-23 RX ADMIN — SIMETHICONE 80 MG: 80 TABLET, CHEWABLE ORAL at 04:05

## 2024-05-23 RX ADMIN — SIMETHICONE 80 MG: 80 TABLET, CHEWABLE ORAL at 10:05

## 2024-05-23 RX ADMIN — IBUPROFEN 800 MG: 800 TABLET, FILM COATED ORAL at 10:05

## 2024-05-23 RX ADMIN — OXYCODONE HYDROCHLORIDE AND ACETAMINOPHEN 1 TABLET: 5; 325 TABLET ORAL at 06:05

## 2024-05-23 RX ADMIN — DOCUSATE SODIUM 200 MG: 100 CAPSULE, LIQUID FILLED ORAL at 08:05

## 2024-05-23 NOTE — PROGRESS NOTES
PostPartum Progress Note        Subjective:      Post-Operative Day #2 after  delivery secondary to twin gestation with NRFHT baby A. .    Patient is without complaints. Lochia decreasing.  Pumping for infants in NICU  Pain is well controlled. Complaints of abdominal gas pain. Reports minimal passive of gas and small bowel movement yesterday after suppository. Patient is ambulating. Tolerating Full Regular diet.Overall mother and baby are doing well.     Objective:      Temp:  [97.7 °F (36.5 °C)-98.3 °F (36.8 °C)] 98.2 °F (36.8 °C)  Pulse:  [] 93  Resp:  [16-18] 18  BP: (104-117)/(64-79) 117/79  No intake or output data in the 24 hours ending 24 0823  There is no height or weight on file to calculate BMI.    General: no acute distress  Abdomen: soft, non-tender, mildly distended; bowel sounds active; Fundus firm and below the umbilicus  Incision- intact, healing well, no sign of infection  Extremities: non-tender, symmetric, trace edema    Group & Rh   Date Value Ref Range Status   2024 O POS  Final     Recent Results (from the past 336 hour(s))   CBC with Differential    Collection Time: 24  6:24 AM   Result Value Ref Range    WBC 15.31 (H) 4.50 - 11.50 x10(3)/mcL    Hgb 7.1 (L) 12.0 - 16.0 g/dL    Hct 22.7 (L) 37.0 - 47.0 %    Platelet 203 130 - 400 x10(3)/mcL   CBC with Differential    Collection Time: 24 12:09 PM   Result Value Ref Range    WBC 9.77 4.50 - 11.50 x10(3)/mcL    Hgb 11.1 (L) 12.0 - 16.0 g/dL    Hct 35.2 (L) 37.0 - 47.0 %    Platelet 278 130 - 400 x10(3)/mcL          Assessment:     33 y.o.  S/P  Delivery Post-Operative Day #2  - Doing Well      Plan:     1. Continue routine postpartum care  2. Plan for D/C  in 1-2 days.   3. Encouraged ambulation and warm showers to relieve gas pains. Continue Surfak as needed and can repeat suppository today if needed. Will continue to monitor.

## 2024-05-23 NOTE — PLAN OF CARE
Problem: Wound  Goal: Optimal Coping  Outcome: Progressing  Goal: Optimal Functional Ability  Outcome: Progressing  Goal: Absence of Infection Signs and Symptoms  Outcome: Progressing  Goal: Improved Oral Intake  Outcome: Progressing  Goal: Optimal Pain Control and Function  Outcome: Progressing  Goal: Skin Health and Integrity  Outcome: Progressing  Goal: Optimal Wound Healing  Outcome: Progressing     Problem: Infection  Goal: Absence of Infection Signs and Symptoms  Outcome: Progressing

## 2024-05-24 LAB — PSYCHE PATHOLOGY RESULT: NORMAL

## 2024-05-24 PROCEDURE — 11000001 HC ACUTE MED/SURG PRIVATE ROOM

## 2024-05-24 PROCEDURE — 25000003 PHARM REV CODE 250: Performed by: OBSTETRICS & GYNECOLOGY

## 2024-05-24 RX ADMIN — IBUPROFEN 800 MG: 800 TABLET, FILM COATED ORAL at 06:05

## 2024-05-24 RX ADMIN — DOCUSATE SODIUM 200 MG: 100 CAPSULE, LIQUID FILLED ORAL at 08:05

## 2024-05-24 RX ADMIN — IBUPROFEN 800 MG: 800 TABLET, FILM COATED ORAL at 02:05

## 2024-05-24 RX ADMIN — OXYCODONE HYDROCHLORIDE AND ACETAMINOPHEN 1 TABLET: 5; 325 TABLET ORAL at 06:05

## 2024-05-24 RX ADMIN — DOCUSATE SODIUM 200 MG: 100 CAPSULE, LIQUID FILLED ORAL at 09:05

## 2024-05-24 RX ADMIN — PRENATAL VITAMINS-IRON FUMARATE 27 MG IRON-FOLIC ACID 0.8 MG TABLET 1 TABLET: at 08:05

## 2024-05-24 RX ADMIN — SIMETHICONE 80 MG: 80 TABLET, CHEWABLE ORAL at 09:05

## 2024-05-24 RX ADMIN — IBUPROFEN 800 MG: 800 TABLET, FILM COATED ORAL at 09:05

## 2024-05-24 RX ADMIN — OXYCODONE HYDROCHLORIDE AND ACETAMINOPHEN 1 TABLET: 5; 325 TABLET ORAL at 09:05

## 2024-05-24 NOTE — PROGRESS NOTES
PostPartum Progress Note        Subjective:      Post-Operative Day #3 after  delivery secondary to twin gestation with NRFHT baby A. .    Patient is without complaints. Lochia decreasing.  Pumping for infants in the NICU  Pain is well controlled. Patient is ambulating. Tolerating Full Regular diet.Overall mother and baby are doing well.     Objective:      Temp:  [98.1 °F (36.7 °C)-98.6 °F (37 °C)] 98.6 °F (37 °C)  Pulse:  [] 108  Resp:  [16-18] 18  SpO2:  [100 %] 100 %  BP: (103-128)/(68-82) 128/82  No intake or output data in the 24 hours ending 24 0812  There is no height or weight on file to calculate BMI.    General: no acute distress  Abdomen: soft, non-tender, non-distended; Fundus firm and below the umbilicus  Incision- intact, healing well, no sign of infection  Extremities: non-tender, symmetric, trace edema    Group & Rh   Date Value Ref Range Status   2024 O POS  Final     Recent Results (from the past 336 hour(s))   CBC with Differential    Collection Time: 24  6:24 AM   Result Value Ref Range    WBC 15.31 (H) 4.50 - 11.50 x10(3)/mcL    Hgb 7.1 (L) 12.0 - 16.0 g/dL    Hct 22.7 (L) 37.0 - 47.0 %    Platelet 203 130 - 400 x10(3)/mcL   CBC with Differential    Collection Time: 24 12:09 PM   Result Value Ref Range    WBC 9.77 4.50 - 11.50 x10(3)/mcL    Hgb 11.1 (L) 12.0 - 16.0 g/dL    Hct 35.2 (L) 37.0 - 47.0 %    Platelet 278 130 - 400 x10(3)/mcL          Assessment:     33 y.o.  S/P  Delivery Post-Operative Day #3  - Doing Well      Plan:     1. Continue routine postpartum care  2. Plan for D/C in AM

## 2024-05-25 VITALS
TEMPERATURE: 99 F | HEART RATE: 99 BPM | OXYGEN SATURATION: 99 % | RESPIRATION RATE: 18 BRPM | SYSTOLIC BLOOD PRESSURE: 131 MMHG | DIASTOLIC BLOOD PRESSURE: 85 MMHG

## 2024-05-25 PROCEDURE — 25000003 PHARM REV CODE 250: Performed by: OBSTETRICS & GYNECOLOGY

## 2024-05-25 RX ORDER — OXYCODONE AND ACETAMINOPHEN 5; 325 MG/1; MG/1
1 TABLET ORAL EVERY 4 HOURS PRN
Qty: 30 TABLET | Refills: 0 | Status: SHIPPED | OUTPATIENT
Start: 2024-05-25

## 2024-05-25 RX ORDER — IBUPROFEN 800 MG/1
800 TABLET ORAL EVERY 8 HOURS
Qty: 30 TABLET | Refills: 0 | Status: SHIPPED | OUTPATIENT
Start: 2024-05-25

## 2024-05-25 RX ADMIN — IBUPROFEN 800 MG: 800 TABLET, FILM COATED ORAL at 05:05

## 2024-05-25 RX ADMIN — PRENATAL VITAMINS-IRON FUMARATE 27 MG IRON-FOLIC ACID 0.8 MG TABLET 1 TABLET: at 08:05

## 2024-05-25 RX ADMIN — OXYCODONE HYDROCHLORIDE AND ACETAMINOPHEN 1 TABLET: 5; 325 TABLET ORAL at 02:05

## 2024-05-25 RX ADMIN — SIMETHICONE 80 MG: 80 TABLET, CHEWABLE ORAL at 05:05

## 2024-05-25 RX ADMIN — DOCUSATE SODIUM 200 MG: 100 CAPSULE, LIQUID FILLED ORAL at 08:05

## 2024-05-25 RX ADMIN — IBUPROFEN 800 MG: 800 TABLET, FILM COATED ORAL at 02:05

## 2024-05-25 RX ADMIN — SIMETHICONE 80 MG: 80 TABLET, CHEWABLE ORAL at 11:05

## 2024-05-25 NOTE — DISCHARGE SUMMARY
"Delivery Discharge Summary  Obstetrics      Primary OB Clinician:  Kaleigh Membreno MD    Discharge Provider: Kaleigh Membreno MD    Admission date: 2024  Discharge date: 2024    Admit Dx:   Discharge Dx:    Patient Active Problem List   Diagnosis    - Monochorionic diamniotic twin gestation in third trimester    Twin gestation, monochorionic diamniotic    IUGR (intrauterine growth restriction) affecting care of mother, third trimester, fetus 1     delivery delivered       Procedure: , due to twins, IUGR  and NRFHT    Hospital Course:  Nat Cruz is a 33 y.o. now  who was admitted on 2024 for delivery. Patient delivered a viable  twin boys. Please see delivery note for further details. Pt was in stable condition post delivery and was transferred to the Mother-Baby Unit. Her postpartum course was uncomplicated. On the date of discharge, patient's pain is controlled with oral pain medications. She is tolerating ambulation without SOB or CP, and PO diet without N/V. Reported lochia is within the normal range. Pt in stable condition and ready for discharge.     Pertinent studies:  Postpartum CBC  Lab Results   Component Value Date    WBC 15.31 (H) 2024    HGB 7.1 (L) 2024    HCT 22.7 (L) 2024    MCV 84.1 2024     2024          OLIVE Cruz [[id number]]     Delivery:    Episiotomy:     Lacerations:     Repair suture:     Repair # of packets:     Blood loss (ml):       Birth information:  YOB: 2023   Time of birth: 3:53 PM   Sex: male   Delivery type: , Low Transverse   Gestational Age: 35w2d     Measurements    Weight: 2300 g  Weight (lbs): 5 lb 1.1 oz  Length: 109.2 cm  Length (in): 43"  Head circumference: 31 cm  Chest circumference: 27.5 cm  Abdominal girth: 24 cm         Delivery Clinician: Delivery Providers    Delivering clinician: Chelly Crowder MD          Additional  information:  Forceps:  "   Vacuum:    Breech:    Observed anomalies      Living?:     Apgars    Living status: Living  Apgar Component Scores:  1 min.:  5 min.:  10 min.:  15 min.:  20 min.:    Skin color:  0  0       Heart rate:  2  2       Reflex irritability:  1  2       Muscle tone:  1  1       Respiratory effort:  2  2       Total:  6  7                Placenta: Delivered:       appearance     MARY Cruz [[id number]]     Delivery:    Episiotomy:     Lacerations:     Repair suture:     Repair # of packets:     Blood loss (ml):       Birth information:  YOB: 2023   Time of birth: 3:54 PM   Sex: male   Delivery type: , Low Transverse   Gestational Age: 35w2d     Measurements    Weight: 2830 g  Weight (lbs): 6 lb 3.8 oz  Length:   Head circumference: 24 cm  Chest circumference: 31 cm         Delivery Clinician: Delivery Providers    Delivering clinician: Chelly Crowder MD          Additional  information:  Forceps:    Vacuum:    Breech:    Observed anomalies      Living?:     Apgars    Living status: Living  Apgar Component Scores:  1 min.:  5 min.:  10 min.:  15 min.:  20 min.:    Skin color:  0  1       Heart rate:  2  2       Reflex irritability:  2  2       Muscle tone:  2  2       Respiratory effort:  2  2       Total:  8  9                Placenta: Delivered:       appearance    Disposition: To home, self care    Follow Up: 2 weeks    Patient Instructions:   1. Call the office for any bleeding >2 pads/hour for >2 hours, temperature >100.4, pain that is uncontrolled with medications, or for any other concerns.  2. Pelvic rest and no tub baths x 6 weeks.  3. No driving while on narcotics.  4. PNV, iron.    Current Discharge Medication List        START taking these medications    Details   ibuprofen (ADVIL,MOTRIN) 800 MG tablet Take 1 tablet (800 mg total) by mouth every 8 (eight) hours.  Qty: 30 tablet, Refills: 0      oxyCODONE-acetaminophen (PERCOCET) 5-325 mg per tablet Take 1 tablet by mouth  every 4 (four) hours as needed for Pain.  Qty: 30 tablet, Refills: 0    Comments: Quantity prescribed more than 7 day supply? Yes, quantity medically necessary           CONTINUE these medications which have NOT CHANGED    Details   ACCRUFER 30 mg Cap Take 1 capsule by mouth once daily.      aspirin (ECOTRIN) 81 MG EC tablet Take 81 mg by mouth once daily.      prenatal vit no.124/iron/folic (PRENATAL VITAMIN ORAL) Take by mouth.      busPIRone (BUSPAR) 5 MG Tab Take 1 tablet (5 mg total) by mouth 3 (three) times daily as needed (anxiety).  Qty: 60 tablet, Refills: 3    Associated Diagnoses: Anxiety during pregnancy      famotidine (PEPCID) 20 MG tablet Take 20 mg by mouth 2 (two) times daily.      folic acid (FOLVITE) 1 MG tablet Take 1 mg by mouth once daily.             Kaleigh Membreno

## 2024-05-27 ENCOUNTER — PATIENT MESSAGE (OUTPATIENT)
Dept: ADMINISTRATIVE | Facility: OTHER | Age: 34
End: 2024-05-27
Payer: COMMERCIAL

## 2024-05-28 ENCOUNTER — PATIENT MESSAGE (OUTPATIENT)
Dept: ADMINISTRATIVE | Facility: OTHER | Age: 34
End: 2024-05-28
Payer: COMMERCIAL

## 2024-06-11 ENCOUNTER — PATIENT MESSAGE (OUTPATIENT)
Dept: MATERNAL FETAL MEDICINE | Facility: CLINIC | Age: 34
End: 2024-06-11
Payer: COMMERCIAL

## (undated) DEVICE — TRAY CATH FOL SIL URIMTR 16FR

## (undated) DEVICE — BINDER ABDOM 4PANEL 12IN LG/XL

## (undated) DEVICE — PAD SANITARY OB STERILE

## (undated) DEVICE — SUT VICRYL 2-0 36 CT-1

## (undated) DEVICE — ELECTRODE REM PLYHSV RETURN 9

## (undated) DEVICE — SUT 2/0 27IN PLAIN GUT CT

## (undated) DEVICE — SOL WATER STRL IRR 1000ML

## (undated) DEVICE — SEE MEDLINE ITEM 157117

## (undated) DEVICE — SUT CHROMIC GUT 2-0 CT-1 27IN

## (undated) DEVICE — SEE MEDLINE ITEM 156931

## (undated) DEVICE — BULB SYRINGE EAR IRRIGATION

## (undated) DEVICE — SOL NACL IRR 1000ML BTL

## (undated) DEVICE — SUT MONOCRYL 4-0 PS-1 UND

## (undated) DEVICE — CAP BABY BEANIE

## (undated) DEVICE — PAD UNDERPAD 30X30

## (undated) DEVICE — Device